# Patient Record
Sex: MALE | Race: WHITE | NOT HISPANIC OR LATINO | URBAN - METROPOLITAN AREA
[De-identification: names, ages, dates, MRNs, and addresses within clinical notes are randomized per-mention and may not be internally consistent; named-entity substitution may affect disease eponyms.]

---

## 2023-02-13 ENCOUNTER — INPATIENT (INPATIENT)
Facility: HOSPITAL | Age: 73
LOS: 2 days | Discharge: HOME | DRG: 433 | End: 2023-02-16
Attending: INTERNAL MEDICINE | Admitting: HOSPITALIST
Payer: MEDICARE

## 2023-02-13 VITALS
OXYGEN SATURATION: 97 % | RESPIRATION RATE: 18 BRPM | DIASTOLIC BLOOD PRESSURE: 85 MMHG | HEART RATE: 90 BPM | SYSTOLIC BLOOD PRESSURE: 178 MMHG

## 2023-02-13 DIAGNOSIS — R07.9 CHEST PAIN, UNSPECIFIED: ICD-10-CM

## 2023-02-13 LAB
ALBUMIN SERPL ELPH-MCNC: 2.5 G/DL — LOW (ref 3.3–5.2)
ALP SERPL-CCNC: 143 U/L — HIGH (ref 40–120)
ALT FLD-CCNC: 18 U/L — SIGNIFICANT CHANGE UP
ANION GAP SERPL CALC-SCNC: 7 MMOL/L — SIGNIFICANT CHANGE UP (ref 5–17)
APPEARANCE UR: CLEAR — SIGNIFICANT CHANGE UP
AST SERPL-CCNC: 44 U/L — HIGH
BACTERIA # UR AUTO: ABNORMAL
BASOPHILS # BLD AUTO: 0.04 K/UL — SIGNIFICANT CHANGE UP (ref 0–0.2)
BASOPHILS NFR BLD AUTO: 0.7 % — SIGNIFICANT CHANGE UP (ref 0–2)
BILIRUB SERPL-MCNC: 3.3 MG/DL — HIGH (ref 0.4–2)
BILIRUB UR-MCNC: NEGATIVE — SIGNIFICANT CHANGE UP
BUN SERPL-MCNC: 15.1 MG/DL — SIGNIFICANT CHANGE UP (ref 8–20)
CALCIUM SERPL-MCNC: 8.7 MG/DL — SIGNIFICANT CHANGE UP (ref 8.4–10.5)
CHLORIDE SERPL-SCNC: 107 MMOL/L — SIGNIFICANT CHANGE UP (ref 96–108)
CO2 SERPL-SCNC: 24 MMOL/L — SIGNIFICANT CHANGE UP (ref 22–29)
COLOR SPEC: YELLOW — SIGNIFICANT CHANGE UP
CREAT SERPL-MCNC: 1.27 MG/DL — SIGNIFICANT CHANGE UP (ref 0.5–1.3)
DIFF PNL FLD: ABNORMAL
EGFR: 60 ML/MIN/1.73M2 — SIGNIFICANT CHANGE UP
EOSINOPHIL # BLD AUTO: 0.39 K/UL — SIGNIFICANT CHANGE UP (ref 0–0.5)
EOSINOPHIL NFR BLD AUTO: 6.4 % — HIGH (ref 0–6)
EPI CELLS # UR: SIGNIFICANT CHANGE UP
FLUAV AG NPH QL: SIGNIFICANT CHANGE UP
FLUBV AG NPH QL: SIGNIFICANT CHANGE UP
GLUCOSE SERPL-MCNC: 106 MG/DL — HIGH (ref 70–99)
GLUCOSE UR QL: NEGATIVE MG/DL — SIGNIFICANT CHANGE UP
HCT VFR BLD CALC: 34.1 % — LOW (ref 39–50)
HGB BLD-MCNC: 11.5 G/DL — LOW (ref 13–17)
IMM GRANULOCYTES NFR BLD AUTO: 0.2 % — SIGNIFICANT CHANGE UP (ref 0–0.9)
KETONES UR-MCNC: NEGATIVE — SIGNIFICANT CHANGE UP
LEUKOCYTE ESTERASE UR-ACNC: NEGATIVE — SIGNIFICANT CHANGE UP
LYMPHOCYTES # BLD AUTO: 1.2 K/UL — SIGNIFICANT CHANGE UP (ref 1–3.3)
LYMPHOCYTES # BLD AUTO: 19.6 % — SIGNIFICANT CHANGE UP (ref 13–44)
MCHC RBC-ENTMCNC: 33.1 PG — SIGNIFICANT CHANGE UP (ref 27–34)
MCHC RBC-ENTMCNC: 33.7 GM/DL — SIGNIFICANT CHANGE UP (ref 32–36)
MCV RBC AUTO: 98.3 FL — SIGNIFICANT CHANGE UP (ref 80–100)
MONOCYTES # BLD AUTO: 0.73 K/UL — SIGNIFICANT CHANGE UP (ref 0–0.9)
MONOCYTES NFR BLD AUTO: 11.9 % — SIGNIFICANT CHANGE UP (ref 2–14)
NEUTROPHILS # BLD AUTO: 3.74 K/UL — SIGNIFICANT CHANGE UP (ref 1.8–7.4)
NEUTROPHILS NFR BLD AUTO: 61.2 % — SIGNIFICANT CHANGE UP (ref 43–77)
NITRITE UR-MCNC: NEGATIVE — SIGNIFICANT CHANGE UP
NT-PROBNP SERPL-SCNC: 221 PG/ML — SIGNIFICANT CHANGE UP (ref 0–300)
PH UR: 6 — SIGNIFICANT CHANGE UP (ref 5–8)
PLATELET # BLD AUTO: 86 K/UL — LOW (ref 150–400)
POTASSIUM SERPL-MCNC: 3.5 MMOL/L — SIGNIFICANT CHANGE UP (ref 3.5–5.3)
POTASSIUM SERPL-SCNC: 3.5 MMOL/L — SIGNIFICANT CHANGE UP (ref 3.5–5.3)
PROT SERPL-MCNC: 5.6 G/DL — LOW (ref 6.6–8.7)
PROT UR-MCNC: NEGATIVE — SIGNIFICANT CHANGE UP
RBC # BLD: 3.47 M/UL — LOW (ref 4.2–5.8)
RBC # FLD: 15.5 % — HIGH (ref 10.3–14.5)
RBC CASTS # UR COMP ASSIST: ABNORMAL /HPF (ref 0–4)
RSV RNA NPH QL NAA+NON-PROBE: SIGNIFICANT CHANGE UP
SARS-COV-2 RNA SPEC QL NAA+PROBE: SIGNIFICANT CHANGE UP
SODIUM SERPL-SCNC: 138 MMOL/L — SIGNIFICANT CHANGE UP (ref 135–145)
SP GR SPEC: 1.01 — SIGNIFICANT CHANGE UP (ref 1.01–1.02)
TROPONIN T SERPL-MCNC: <0.01 NG/ML — SIGNIFICANT CHANGE UP (ref 0–0.06)
TSH SERPL-MCNC: 2.22 UIU/ML — SIGNIFICANT CHANGE UP (ref 0.27–4.2)
UROBILINOGEN FLD QL: NEGATIVE MG/DL — SIGNIFICANT CHANGE UP
WBC # BLD: 6.11 K/UL — SIGNIFICANT CHANGE UP (ref 3.8–10.5)
WBC # FLD AUTO: 6.11 K/UL — SIGNIFICANT CHANGE UP (ref 3.8–10.5)
WBC UR QL: SIGNIFICANT CHANGE UP /HPF (ref 0–5)

## 2023-02-13 PROCEDURE — 71045 X-RAY EXAM CHEST 1 VIEW: CPT | Mod: 26

## 2023-02-13 PROCEDURE — 99223 1ST HOSP IP/OBS HIGH 75: CPT

## 2023-02-13 PROCEDURE — 74177 CT ABD & PELVIS W/CONTRAST: CPT | Mod: 26,MA

## 2023-02-13 RX ORDER — FUROSEMIDE 40 MG
40 TABLET ORAL
Refills: 0 | Status: DISCONTINUED | OUTPATIENT
Start: 2023-02-13 | End: 2023-02-14

## 2023-02-13 RX ORDER — FUROSEMIDE 40 MG
40 TABLET ORAL ONCE
Refills: 0 | Status: COMPLETED | OUTPATIENT
Start: 2023-02-13 | End: 2023-02-13

## 2023-02-13 RX ORDER — LISINOPRIL 2.5 MG/1
5 TABLET ORAL DAILY
Refills: 0 | Status: DISCONTINUED | OUTPATIENT
Start: 2023-02-13 | End: 2023-02-16

## 2023-02-13 RX ADMIN — Medication 40 MILLIGRAM(S): at 12:54

## 2023-02-13 NOTE — CONSULT NOTE ADULT - PROBLEM SELECTOR RECOMMENDATION 9
-One day of acute chest pain located at the right chest wall nipple site, described as squeezing / sharp, lasting one hour and is now almost completely resolved  -Trop neg x1, trend  -  -CXR xlear  -EKG non ischemic  -Obtain echo. Will determine ischemic eval once results  -Diurese, will order lasix IVP

## 2023-02-13 NOTE — ED ADULT NURSE REASSESSMENT NOTE - NS ED NURSE REASSESS COMMENT FT1
Assumed pt care at this time, A&Ox4, resp even/unlabored, ambulatory, slow steady gait noted as pt assisted to restroom. Pt denies pain/discomfort at this time. Pt noted to have BLLE edema, + BL pedal pulses, v/s obtain, BP elevated, MD made aware, no further interventions at this time. Pt admitted to obs, awaiting transfer to obs unit. No acute distress noted, all safety precautions in place, bed side rails up, bed locked in lowest position Assumed pt care at this time, A&Ox4, resp even/unlabored, ambulatory, slow steady gait noted as pt assisted to restroom. Pt denies pain/discomfort, SOB at this time. Pt noted to have BLLE edema, + BL pedal pulses, v/s obtain, BP elevated, MD made aware, no further interventions at this time, continuos cardiac monitoring and  in place, NSR on monitor, rate 68, SpO2 98% on room air. Pt admitted to obs, awaiting transfer to obs unit. No acute distress noted, all safety precautions in place, bed side rails up, bed locked in lowest position

## 2023-02-13 NOTE — ED PROVIDER NOTE - ATTENDING CONTRIBUTION TO CARE
PT with chest pain, sob and palpitations that started this morning.  PT states that it started approx. 2 h ago. [facility] Pt states that he is supposed to be on a water pill but doesn't know the last time he took it.  no n/v.. no fever/chills. no other complaints.    physical - rrr. ctab. abd - soft, nt. B/L 3+ LE edema.  no rash.    plan - labs and imaging reviewed.  Pt with significant LE edema. cards evaluated and recommended diuresis and tte. Pt with cirrhosis on CT with low albumin likely contributing to his edema.  case d/w dr. mcgee and rema boles.

## 2023-02-13 NOTE — CONSULT NOTE ADULT - SUBJECTIVE AND OBJECTIVE BOX
[facility]                                              CARDIOLOGY AT [address], [place] [address]                                             Telephone: [phone]. Fax:[phone]                                                       CARDIOLOGY CONSULTATION NOTE                                                                                             History obtained by: Patient and medical record  [facility]   obtained: Yes [  ] No [  x]  Reason for Consultation: CP / LE edema  Available out pt records reviewed: Yes [ x ] No [  ]    HPI:    Patient is a 72y old  Male PMH HTN, ?undiagnosed CHF but is on a water pill. Doesn't have an established cardiologist but [facility]. Presents with one day of acute chest pain located at the right chest wall nipple site, described as squeezing / sharp, lasting one hour and is now almost completely resolved. No prior episodes. Family hx of CHF. Endorsed 6 years ago he had a negative NST. Denies SOB, orthopnea, fever, weight gain        CARDIAC TESTING   ECHO:    STRESS:    CATH:     ELECTROPHYSIOLOGY:     PAST MEDICAL HISTORY  HTN (hypertension)        PAST SURGICAL HISTORY      SOCIAL HISTORY:  Denies smoking/alcohol/drugs  CIGARETTES:   former for 3 years   ALCOHOL:  DRUGS:    FAMILY HISTORY:    Family History of Cardiovascular Disease:  Yes [x  ] No [  ]  Coronary Artery Disease in first degree relative: Yes [  ] No [ x ]  Sudden Cardiac Death in First degree relative: Yes [  ] No [ x ]    HOME MEDICATIONS:      CURRENT CARDIAC MEDICATIONS:      CURRENT OTHER MEDICATIONS:      ALLERGIES:   No Known Allergies      REVIEW OF SYMPTOMS:   CONSTITUTIONAL: No fever, no chills, no weight loss, no weight gain, no fatigue   ENMT:  No vertigo; No sinus or throat pain  NECK: No pain or stiffness  CARDIOVASCULAR: +chest pain, no dyspnea, no syncope/presyncope, no palpitations, no dizziness, no Orthopnea, no Paroxsymal nocturnal dyspnea  RESPIRATORY: no Shortness of breath, no cough, no wheezing  : No dysuria, no hematuria   GI: No dark color stool, no nausea, no diarrhea, no constipation, no abdominal pain   NEURO: No headache, no slurred speech   MUSCULOSKELETAL: No joint pain or swelling; No muscle, back, or extremity pain. +LE edema  PSYCH: No agitation, no anxiety.    ALL OTHER REVIEW OF SYSTEMS ARE NEGATIVE.    VITAL SIGNS:  T(C): 37.2 (02-13-23 @ 12:57), Max: 37.2 (02-13-23 @ 12:57)  T(F): 98.9 (02-13-23 @ 12:57), Max: 98.9 (02-13-23 @ 12:57)  HR: 76 (02-13-23 @ 12:52) (76 - 90)  BP: 136/71 (02-13-23 @ 12:52) (136/71 - 178/85)  RR: 20 (02-13-23 @ 12:52) (18 - 20)  SpO2: 97% (02-13-23 @ 12:52) (97% - 97%)    INTAKE AND OUTPUT:       PHYSICAL EXAM:  Constitutional: Comfortable . No acute distress.   HEENT: Atraumatic and normocephalic , neck is supple . no JVD. No carotid bruit.  CNS: A&Ox3. No focal deficits.   Respiratory: CTAB, unlabored   Cardiovascular: RRR normal s1 s2. No murmur. No rubs or gallop.  Gastrointestinal: Soft, non-tender. +Bowel sounds.   Extremities: 2+ Peripheral Pulses, No clubbing, cyanosis, +edema  Psychiatric: Calm . no agitation.   Skin: Warm and dry, no ulcers on extremities     LABS:  ( 13 Feb 2023 12:24 )  Troponin T  <0.01,  CPK  X    , CKMB  X    ,                                 11.5   6.11  )-----------( 86       ( 13 Feb 2023 12:24 )             34.1     02-13    138  |  107  |  15.1  ----------------------------<  106<H>  3.5   |  24.0  |  1.27    Ca    8.7      13 Feb 2023 12:24    TPro  5.6<L>  /  Alb  2.5<L>  /  TBili  3.3<H>  /  DBili  x   /  AST  44<H>  /  ALT  18  /  AlkPhos  143<H>  02-13            Thyroid Stimulating Hormone, Serum: 2.22 uIU/mL (02-13-23 @ 12:24)      INTERPRETATION OF TELEMETRY: SR    ECG: SR no ischemia  Prior ECG: Yes [  ] No [x  ]      < from: Xray Chest 1 View- PORTABLE-Urgent (02.13.23 @ 14:02) >  IMPRESSION:  1. Clear lungs with no acute cardiopulmonary abnormalities.  2. A questionable thin sliver of free air beneath the right hemidiaphragm   cannot be excluded, in addition to noting colonic interposition. Consider   formal abdominal radiographs including an upright view if warranted   clinically.    < end of copied text >

## 2023-02-13 NOTE — ED ADULT NURSE NOTE - OBJECTIVE STATEMENT
A&Ox4, RR even and unlabored. skin is warm and dry. Swelling noted to bilateral lower extremities. PT states legs have been like this for awhile and are occasionally painful. PT coming to ED C/O left side chest pain that began this afternoon while at rest. Denies radiation. +dyspnea on exertion. Deneis cardaic hx. Placed on CM and labs obtained.

## 2023-02-13 NOTE — ED CDU PROVIDER INITIAL DAY NOTE - CLINICAL SUMMARY MEDICAL DECISION MAKING FREE TEXT BOX
PT with mild fluid overload        placed in obs for Therapeutic intensity. PT seen BY OBS PA found to be appropriate CDU PT care transferred to PA.

## 2023-02-13 NOTE — ED PROVIDER NOTE - CLINICAL SUMMARY MEDICAL DECISION MAKING FREE TEXT BOX
71yo M w/pmh HTN, CHF presents for chest pain, shortness of breath x2 hours, significant LE edema on exam. EKG shows NSR. Labs, CXR pending. Lasix 40mg IV given for suspected CHF exacerbation.

## 2023-02-13 NOTE — ED CDU PROVIDER INITIAL DAY NOTE - PHYSICAL EXAMINATION
General: well appearing, NAD  Head: NC/AT  Cardiac: RRR, no m/r/g, 3+ edema of b/l LEs  Respiratory: crackles at bilateral bases, equal chest wall expansions, no conversational dyspnea  Abdomen: soft, ND, NT  Neuro: AAOx3, coordinated movement of all 4 extremities  Psych: calm, cooperative, normal affect  Skin: warm and dry

## 2023-02-13 NOTE — ED PROVIDER NOTE - WR ORDER ID 1
HISTORY OF PRESENT ILLNESS  Patient presents with:  Weight Check: up 8 lbs    Iván Moore is a 35year old male here for follow up with medical weight loss program for the treatment of overweight, obesity, or morbid obesity.  Patient has gained-#8  lbs s N/V/D/C  MUSCULOSKELETAL: denies back pain, joint pains   NEURO: denies headaches or dizziness  PSYCH: denies change in behavior or mood, denies feeling sad or depressed    EXAM  /80   Pulse 90   Temp 98.6 °F (37 °C)   Resp 16   Ht 70\"   Wt (!) 303 tablet, Rfl: 1  Insulin Aspart Pen 100 UNIT/ML Subcutaneous Solution Pen-injector, Use up to 65 units daily, Disp: 60 mL, Rfl: 1  atorvastatin 20 MG Oral Tab, Take 1 tablet (20 mg total) by mouth nightly., Disp: 30 tablet, Rfl: 2  insulin glargine (BASAGLA 25mg bid off label for weight loss  · --advised of side effects and adverse effects of this medication  · Contradictions: none  · ekg completed in office today  · Nutrition: Low carb diet, recommended to eat breakfast daily/ regular protein intake  · [facility] [id number]

## 2023-02-13 NOTE — ED PROVIDER NOTE - PHYSICAL EXAMINATION
General: well appearing, NAD  Head: NC/AT  Cardiac: RRR, no m/r/g, 3+ edema of b/l LEs  Respiratory: crackles at bilateral bases, equal chest wall expansions, no conversational dyspnea  Abdomen: soft, ND, NT  Neuro: AAOx3, coordinated movement of all 4 extremities  Psych: calm, cooperative, normal affect  Skin: warm and dry RX PROGRESS NOTE: Warfarin Anticoagulation Monitoring Initiation Note    Warfarin prescribed for the indication of Deep vein thrombosis/Pulmonary embolism, CVA.    Target INR is 2.0 - 3.0.    Anticipated duration of therapy is Indefinite.    Patient continued on prior warfarin therapy.  Dose prior to pharmacist inpatient anticoagulation management service consultation was 7.5 mg on Friday and 5 mg the other six days of the week.    Most Recent Lab Values:  INR (no units)   Date/Time Value   03/10/2021 0506 1.1     HGB (g/dL)   Date/Time Value   03/10/2021 0506 11.3 (L)     HCT (%)   Date/Time Value   03/10/2021 0506 36.2     PLT (K/mcL)   Date/Time Value   03/10/2021 0506 170     GOT/AST (Units/L)   Date/Time Value   03/10/2021 0506 39 (H)     GPT/ALT (Units/L)   Date/Time Value   03/10/2021 0506 22     Serum creatinine: 1.28 mg/dL (H) 03/10/21 0506  Estimated creatinine clearance: 64.2 mL/min (A)  OB/Gyn status: Other      Medical History:  Weight: Weight: 107.3 kg (03/09/21 0700)  Age: 48 year old  Patient does not have factors that may warrant deviation from the standard protocol (protocol exception).     Other pertinent medical history includes: chronic CHF, HIV.    The patient's medication and allergy profile was reviewed for possible drug-allergy, drug-drug, and drug-herbal interactions.    Assessment & Plan:  For anticoagulation therapy, will continue current warfarin dose of 5 mg once.     Pharmacist inpatient anticoagulation management will review/monitor patient daily and order warfarin dose/regimen based on protocol.    Thank you,    Fabienne Palomino, PHARMD  3/10/2021 4:58 PM

## 2023-02-13 NOTE — ED CDU PROVIDER INITIAL DAY NOTE - NS ED ROS FT
Constitutional: no fever, no sweats, and no chills.  CV: +chest pain, +edema, +palpitations  Resp: no cough, +dyspnea  GI: no abdominal pain, no nausea and no vomiting.  MSK: no msk pain, no weakness  Skin: no lesions, and no rashes.  Neuro: no LOC, +headache, no dizziness  ROS otherwise negative except as noted in HPI.

## 2023-02-13 NOTE — ED ADULT NURSE NOTE - NSIMPLEMENTINTERV_GEN_ALL_ED
Implemented All Fall Risk Interventions:  [place] to call system. Call bell, personal items and telephone within reach. Instruct patient to call for assistance. Room bathroom lighting operational. Non-slip footwear when patient is off stretcher. Physically safe environment: no spills, clutter or unnecessary equipment. Stretcher in lowest position, wheels locked, appropriate side rails in place. Provide visual cue, wrist band, yellow gown, etc. Monitor gait and stability. Monitor for mental status changes and reorient to person, place, and time. Review medications for side effects contributing to fall risk. Reinforce activity limits and safety measures with patient and family.

## 2023-02-13 NOTE — ED ADULT NURSE NOTE - ED STAT RN HANDOFF DETAILS
Report given to BERNARD Green. Pt transported without incident, placed on continuos cardiac monitoring and , NSR on monitor, rate 68, SpO2 98% on room air

## 2023-02-13 NOTE — ED CDU PROVIDER INITIAL DAY NOTE - OBJECTIVE STATEMENT
71yo M w/pmh HTN, CHF presents for chest pain, shortness of breath x2 hours. Reports onset 9:30-10am, assoc/w HA, neck stiffness, palpitations, all symptoms resolved except residual improved HA and CP. Denies h/o similar symptoms. Also reports worsening b/l LE edema for weeks.  Denies f/ch, n/v, abdominal pain. Denies ischemic cardiac hx. Reports he takes a diuretic for b/l LE edema but stops whenever his symptoms improve, doesn't know when he last took it, says the pills got lost while moving residences. Denies sick contacts. Covid vaccinated, not flu vaccinated. Former smoker for 4 years, quit in 1970s. [facility]

## 2023-02-13 NOTE — ED ADULT TRIAGE NOTE - CHIEF COMPLAINT QUOTE
PT BIBA from home for chest pain that started this morning right sided radiating to neck.  Received 2 SL nitro and 324 ASA. No improvement of pain. EKG obtained

## 2023-02-13 NOTE — CONSULT NOTE ADULT - ASSESSMENT
72y old  Male PMH HTN, ?undiagnosed CHF but is on a water pill. Doesn't have an established cardiologist [facility]. Presents with one day of acute chest pain located at the right chest wall nipple site, described as squeezing / sharp, lasting one hour and is now almost completely resolved. No prior episodes. Family hx of CHF. Endorsed 6 years ago he had a negative NST.

## 2023-02-13 NOTE — ED CDU PROVIDER INITIAL DAY NOTE - ATTENDING APP SHARED VISIT CONTRIBUTION OF CARE
71yo M w/pmh HTN, CHF presents for chest pain, shortness of breath x2 hours. Reports onset 9:30-10am, assoc/w HA, neck stiffness, palpitations, all symptoms resolved except residual improved HA and CP. Denies h/o similar symptoms. Also reports worsening b/l LE edema for weeks.  Denies f/ch, n/v, abdominal pain. Denies ischemic cardiac hx. Reports he takes a diuretic for b/l LE edema but stops whenever his symptoms improve, doesn't know when he last took it, says the pills got lost while moving residences. Denies sick contacts.    seen by cardio who reccs diuresis, echo, and re-eval in AM

## 2023-02-13 NOTE — ED PROVIDER NOTE - OBJECTIVE STATEMENT
73yo M w/pmh HTN, CHF presents for chest pain, shortness of breath x2 hours. Reports onset 9:30-10am, assoc/w HA, neck stiffness, palpitations, all symptoms resolved except residual improved HA and CP. Denies h/o similar symptoms. Also reports worsening b/l LE edema for weeks.  Denies f/ch, n/v, abdominal pain. Denies ischemic cardiac hx. Reports he takes a diuretic for b/l LE edema but stops whenever his symptoms improve, doesn't know when he last took it, says the pills got lost while moving residences. Denies sick contacts. Covid vaccinated, not flu vaccinated. Former smoker for 4 years, quit in 1970s. [facility].

## 2023-02-13 NOTE — ED ADULT NURSE REASSESSMENT NOTE - NS ED NURSE REASSESS COMMENT FT1
PT returning back from CT scan now. reports relief of chest pain. Denies SOB while ambulating. Ambulates with assistance to restroom.

## 2023-02-14 DIAGNOSIS — I10 ESSENTIAL (PRIMARY) HYPERTENSION: ICD-10-CM

## 2023-02-14 DIAGNOSIS — R07.9 CHEST PAIN, UNSPECIFIED: ICD-10-CM

## 2023-02-14 DIAGNOSIS — R79.89 OTHER SPECIFIED ABNORMAL FINDINGS OF BLOOD CHEMISTRY: ICD-10-CM

## 2023-02-14 DIAGNOSIS — K86.9 DISEASE OF PANCREAS, UNSPECIFIED: ICD-10-CM

## 2023-02-14 DIAGNOSIS — K74.60 UNSPECIFIED CIRRHOSIS OF LIVER: ICD-10-CM

## 2023-02-14 DIAGNOSIS — R60.0 LOCALIZED EDEMA: ICD-10-CM

## 2023-02-14 DIAGNOSIS — Z90.49 ACQUIRED ABSENCE OF OTHER SPECIFIED PARTS OF DIGESTIVE TRACT: Chronic | ICD-10-CM

## 2023-02-14 LAB
ALBUMIN SERPL ELPH-MCNC: 2.9 G/DL — LOW (ref 3.3–5.2)
ALP SERPL-CCNC: 152 U/L — HIGH (ref 40–120)
ALT FLD-CCNC: 21 U/L — SIGNIFICANT CHANGE UP
ANION GAP SERPL CALC-SCNC: 10 MMOL/L — SIGNIFICANT CHANGE UP (ref 5–17)
APTT BLD: 40.5 SEC — HIGH (ref 27.5–35.5)
AST SERPL-CCNC: 54 U/L — HIGH
BILIRUB SERPL-MCNC: 4.1 MG/DL — HIGH (ref 0.4–2)
BUN SERPL-MCNC: 15.2 MG/DL — SIGNIFICANT CHANGE UP (ref 8–20)
CALCIUM SERPL-MCNC: 8.5 MG/DL — SIGNIFICANT CHANGE UP (ref 8.4–10.5)
CHLORIDE SERPL-SCNC: 106 MMOL/L — SIGNIFICANT CHANGE UP (ref 96–108)
CO2 SERPL-SCNC: 24 MMOL/L — SIGNIFICANT CHANGE UP (ref 22–29)
CREAT SERPL-MCNC: 1.15 MG/DL — SIGNIFICANT CHANGE UP (ref 0.5–1.3)
EGFR: 68 ML/MIN/1.73M2 — SIGNIFICANT CHANGE UP
GLUCOSE SERPL-MCNC: 68 MG/DL — LOW (ref 70–99)
INR BLD: 1.71 RATIO — HIGH (ref 0.88–1.16)
POTASSIUM SERPL-MCNC: 3.4 MMOL/L — LOW (ref 3.5–5.3)
POTASSIUM SERPL-SCNC: 3.4 MMOL/L — LOW (ref 3.5–5.3)
PROT SERPL-MCNC: 6.3 G/DL — LOW (ref 6.6–8.7)
PROTHROM AB SERPL-ACNC: 20 SEC — HIGH (ref 10.5–13.4)
SODIUM SERPL-SCNC: 140 MMOL/L — SIGNIFICANT CHANGE UP (ref 135–145)
TROPONIN T SERPL-MCNC: <0.01 NG/ML — SIGNIFICANT CHANGE UP (ref 0–0.06)

## 2023-02-14 PROCEDURE — 76705 ECHO EXAM OF ABDOMEN: CPT | Mod: 26

## 2023-02-14 PROCEDURE — 99285 EMERGENCY DEPT VISIT HI MDM: CPT

## 2023-02-14 PROCEDURE — 99233 SBSQ HOSP IP/OBS HIGH 50: CPT

## 2023-02-14 PROCEDURE — 99223 1ST HOSP IP/OBS HIGH 75: CPT

## 2023-02-14 RX ORDER — FUROSEMIDE 40 MG
40 TABLET ORAL DAILY
Refills: 0 | Status: DISCONTINUED | OUTPATIENT
Start: 2023-02-14 | End: 2023-02-16

## 2023-02-14 RX ORDER — SPIRONOLACTONE 25 MG/1
25 TABLET, FILM COATED ORAL DAILY
Refills: 0 | Status: DISCONTINUED | OUTPATIENT
Start: 2023-02-14 | End: 2023-02-16

## 2023-02-14 RX ADMIN — SPIRONOLACTONE 25 MILLIGRAM(S): 25 TABLET, FILM COATED ORAL at 19:09

## 2023-02-14 RX ADMIN — Medication 40 MILLIGRAM(S): at 05:23

## 2023-02-14 RX ADMIN — LISINOPRIL 5 MILLIGRAM(S): 2.5 TABLET ORAL at 05:23

## 2023-02-14 NOTE — ED ADULT NURSE REASSESSMENT NOTE - NS ED NURSE REASSESS COMMENT FT1
Assumed care of the patient @0730. Pt A&Ox4, VSS afebrile. Pt on cardiac monitor, normal sinus, denies chest pain at this time. Awaiting ECHo results and cardiac consult.  Patient in understanding of plan of care. Patient with no further questions for the RN. Resting in comfort. Call bell within reach and encouraged to use when assistance needed. Will continue to monitor.

## 2023-02-14 NOTE — CONSULT NOTE ADULT - PROBLEM SELECTOR RECOMMENDATION 2
CT A/P- mild intra/extrahepatic biliary dilation s/p cholecystectomy  US abd- CBD not visualized, mild prominence intrahepatic radicles as noted on recent CT    - Recommend MRCP w/wo contrast to further assess, may need ERCP   - Trend CBC, LFTs  - Monitor for fever  - Low fat diet

## 2023-02-14 NOTE — H&P ADULT - HISTORY OF PRESENT ILLNESS
72yr old male with PMH HTN and bilateral chronic pedal swelling presented to Er with Rt sided chest pain with shortness of breath. In ER, he was found to be HD stable, Labs show increased LFTs, T bili 3.3, Alk phos 143, AST 44 , ALT 18 gb 11.5, platelets 86, INR 1.71, albumin 2.9.  CT abd/pelvis shows end stage cirrhotic liver with portal hypertension and a spontaneous splenorenal shunt, cystic pancreatic body and tail lesions, no ascites, sigmoid diverticulosis, mild intra/extrahepatic biliary dilation s/p cholecystectomy. US abdomen shows cirrhotic liver, common bile duct not visualized, mild prominence of intrahepatic radicles as noted on recent CT, no ascites.  He was bev by cardio, however No concern for CHF, he symptoms likely 2/2 cirrhosis. GI recommends MRCP for further eval. He feels better from pain standpoint.

## 2023-02-14 NOTE — H&P ADULT - NSHPPHYSICALEXAM_GEN_ALL_CORE
PHYSICAL EXAM:    GENERAL: NAD, well-groomed, well-developed  HEAD:  Atraumatic, Normocephalic  EYES: EOMI, PERRLA, conjunctiva and sclera clear  ENMT: dry mucous membranes, edentulous, wears false teeth  NECK: Supple, No JVD  NERVOUS SYSTEM:  Alert & Oriented X3, Good concentration;   CHEST/LUNG: Clear to percussion bilaterally; No rales, rhonchi  HEART: Regular rate and rhythm; No murmurs  ABDOMEN: Soft, Nontender, Nondistended; Bowel sounds present  EXTREMITIES:  No clubbing, cyanosis, or edema  SKIN: maculopapular rash all over extensor surface

## 2023-02-14 NOTE — CONSULT NOTE ADULT - PROBLEM SELECTOR RECOMMENDATION 3
CT A/P- Multiple well-circumscribed cystic lesions within the pancreatic body/tail measuring up to 12x10 mm, favored to represent small pseudocysts or side branch IPMN.     - Recommend MRCP w/wo contrast to further assess as above   - May need eventual EUS   _________________________________________________________________  Assessment and recommendations are final when note is signed by the attending physician.

## 2023-02-14 NOTE — ED CDU PROVIDER DISPOSITION NOTE - CLINICAL COURSE
pt is a 71 y/o male with a pmhx of HTN, CHF that presented to the ed for chest pain, SOB was placed in observation for CHF exacerbation was seen by [place] cardiology had echo, cardiology stating most likely not from CHF, but due to new end stage cirrhosis GI consult obtained - will admit for further management of cirrhosis, elevated LFT's, MRCP

## 2023-02-14 NOTE — PROGRESS NOTE ADULT - NS ATTEND AMEND GEN_ALL_CORE FT
seen with above,    72M h/o obesity, former alcohol use quit >8yrs ago, HTN, [facility] was previously [facility] a [facility] presents with R-sided chest pain and LE edema  -pBNP 200s and CXR without pulmonary edema, TTE done with normal biventricular systolic function, no evidence of CHF  -etiology of LE suspect from lymphedema, recommend ACE bandage and outpatient vascular evaluation  -CT ab/pel with reports of end-stage cirrhosis with portal hypertension and splenorenal shunting- recommend GI/hepatology evaluation as patient is not aware of cirrhosis history, may benefit from Aldactone/Lasix combination; baseline PLT 86, T-bili 4.1 and INR 1.7 with MELD score of 19  -reconsult if new cardiac issue arise         Og Angelo DO, [facility]  Faculty Non-Invasive Cardiologist  [phone]

## 2023-02-14 NOTE — ED CDU PROVIDER DISPOSITION NOTE - ATTENDING CONTRIBUTION TO CARE
I agree with the PA's note and was available for any issues/concerns. I was directly involved in patient care. My brief overall assessment is as follows:    GI assessment noted; will admit

## 2023-02-14 NOTE — ED CDU PROVIDER SUBSEQUENT DAY NOTE - CLINICAL SUMMARY MEDICAL DECISION MAKING FREE TEXT BOX
PT with mild fluid overload  placed in obs for Therapeutic intensity.   Cardiology consulted  IV dirueses  Trend CR  CXR, negative, EKG non-ischemic  TTE Pending

## 2023-02-14 NOTE — PROGRESS NOTE ADULT - NS ATTEND OPT1A GEN_ALL_CORE
Progress Note    Pt was seen and examined. No wound issues overnight. Tolerating NPWT well  Denies f/c/n/v/d/cp/sob.        Scheduled Meds:   Current Facility-Administered Medications   Medication Dose Route Frequency Provider Last Rate Last Admin   â¢ gabapentin (NEURONTIN) capsule 300 mg  300 mg Oral TID Pradeep Schaefer MD   300 mg at 07/06/22 0902   â¢ metroNIDAZOLE (FLAGYL) tablet 500 mg  500 mg Oral TID Donato Dc PA-C   500 mg at 07/06/22 0902   â¢ losartan (COZAAR) tablet 100 mg  100 mg Oral Daily Emely Xie MD   100 mg at 07/06/22 0902   â¢ insulin lispro (ADMELOG,HumaLOG) - Correction Dose   Subcutaneous TID  Emely Xie MD   1 Units at 07/06/22 1154   â¢ sodium hypochlorite (DAKINS) 0.125 % (1/4 strength) irrigation solution   Topical 2 times per day Alejandra Cuellar MD   Given at 07/06/22 0910   â¢ furosemide (LASIX) tablet 40 mg  40 mg Oral Daily Emely Xie MD   40 mg at 07/06/22 0902   â¢ [Held by provider] insulin glargine (LANTUS) injection 9 Units  9 Units Subcutaneous Daily Emely Xie MD   9 Units at 07/01/22 0942   â¢ lidocaine (LIDOCARE) 4 % patch 1 patch  1 patch Transdermal Daily Emely Xie MD   1 patch at 07/06/22 0905   â¢ [Held by provider] insulin lispro (ADMELOG, HumaLOG) injection 7 Units  7 Units Subcutaneous TID  Marli Zhong MD   7 Units at 06/30/22 0828   â¢ piperacillin-tazobactam (ZOSYN) 3.375 g in sodium chloride 0.9 % 100 mL IVPB  3.375 g Intravenous 3 times per day Radha Santos MD 25 mL/hr at 07/06/22 0505 3.375 g at 07/06/22 0505   â¢ sodium chloride (PF) 0.9 % injection 10 mL  10 mL Injection 2 times per day Opal Goldberg DO   10 mL at 07/06/22 0910   â¢ potassium CHLORIDE (KLOR-CON M) vikash ER tablet 20 mEq  20 mEq Oral Daily with breakfast Dolorita H Onguru, CNP   20 mEq at 07/06/22 0901   â¢ insulin lispro (ADMELOG,HumaLOG) - Correction Dose   Subcutaneous Nightly Opal Goldberg DO   1 Units at 06/27/22 2137   â¢ enoxaparin (LOVENOX) injection 40 mg  40 mg Subcutaneous Daily Krishan Fontan, DO   40 mg at 07/06/22 0902   â¢ docusate sodium-sennosides (SENOKOT S) 50-8.6 MG 1 tablet  1 tablet Oral BID Krishan Fontan, DO   1 tablet at 07/06/22 0901   â¢ polyethylene glycol (MIRALAX) packet 17 g  17 g Oral Daily Krishan Fontan, DO   17 g at 07/03/22 0818   â¢ rosuvastatin (CRESTOR) tablet 20 mg  20 mg Oral Nightly Krishan Fontan, DO   20 mg at 07/05/22 2018   â¢ cholecalciferol (VITAMIN D) tablet 25 mcg  25 mcg Oral Daily Krishan Fontan, DO   25 mcg at 07/06/22 0902   â¢ mirtazapine (REMERON) tablet 15 mg  15 mg Oral Nightly Krishan Fontan, DO   15 mg at 07/05/22 2019   â¢ sodium chloride (PF) 0.9 % injection 2 mL  2 mL Intracatheter 2 times per day Lizzie Cornejo MD   2 mL at 07/06/22 0910   â¢ Phosphorus Standard Replacement Protocol   Does not apply See Vin Smallwood MD       â¢ Magnesium Standard Replacement Protocol   Does not apply See Vin Smallwood MD       â¢ clopidogrel (PLAVIX) tablet 75 mg  75 mg Oral Daily Lizzie Cornejo MD   75 mg at 07/06/22 0902   â¢ aspirin (ECOTRIN) enteric coated tablet 81 mg  81 mg Oral Daily Lizzie Cornejo MD   81 mg at 07/06/22 0901   â¢ finasteride (PROSCAR) tablet 5 mg  5 mg Oral Nightly Lizzie Cornejo MD   5 mg at 07/05/22 2018   â¢ tamsulosin (FLOMAX) capsule 0.4 mg  0.4 mg Oral QHS Lizzie Cornejo MD   0.4 mg at 07/05/22 2018   â¢ ferrous sulfate (65 mg Fe per 325 mg) tablet 325 mg  325 mg Oral Every Other Day Lizzie Cornejo MD   325 mg at 07/05/22 0831   â¢ acetaminophen (TYLENOL) tablet 650 mg  650 mg Oral Q6H Thaddeus Blas MD   650 mg at 07/06/22 0902   â¢ cyanocobalamin (Vitamin B-12) tablet 1,000 mcg  1,000 mcg Oral Daily Krishan Fontan, DO   1,000 mcg at 07/06/22 0902   â¢ metoPROLOL succinate (TOPROL-XL) ER tablet 25 mg  25 mg Oral Daily Krishan Bello DO   25 mg at 07/06/22 0902        PRN Meds:  Current Facility-Administered Medications   Medication Dose Route Frequency Provider Last Rate Last Admin   â¢ sodium chloride 0.9% infusion   Intravenous Continuous PRN Fransisco Rosenthal MD       â¢ cyclobenzaprine (FLEXERIL) tablet 5 mg  5 mg Oral TID PRN Fransisco Rosenthal MD       â¢ morphine injection 4 mg  4 mg Intravenous Q4H PRN Fransisco Rosenthal MD       â¢ HYDROcodone-acetaminophen [facility])  MG per tablet 1 tablet  1 tablet Oral Q6H PRN Fransisco Rosenthal MD   1 tablet at 07/05/22 1440   â¢ sodium chloride (PF) 0.9 % injection 10 mL  10 mL Injection PRN Jose Gill, DO       â¢ sodium chloride (PF) 0.9 % injection 20 mL  20 mL Injection PRN Jose Gill, DO       â¢ sodium chloride 0.9% infusion   Intravenous Continuous PRN Doloritatyana H Onguru, CNP 25 mL/hr at 06/29/22 0503 New Bag at 06/29/22 0503   â¢ polyethylene glycol (MIRALAX) packet 17 g  17 g Oral Daily PRN Jose Gill, DO       â¢ bisacodyl (DULCOLAX) suppository 10 mg  10 mg Rectal Daily PRN Jose Gill, DO       â¢ sodium chloride 0.9 % flush bag 25 mL  25 mL Intravenous PRN Christine Lentz MD       â¢ sodium chloride 0.9 % flush bag 25 mL  25 mL Intravenous PRN Christine Lentz MD       â¢ docusate sodium-sennosides (SENOKOT S) 50-8.6 MG 2 tablet  2 tablet Oral Daily PRN Christine Lentz MD       â¢ dextrose 50 % injection 25 g  25 g Intravenous PRN Christine Lentz MD       â¢ dextrose 50 % injection 12.5 g  12.5 g Intravenous PRN Christine Lentz MD       â¢ glucagon (GLUCAGEN) injection 1 mg  1 mg Intramuscular PRN Christine Lentz MD       â¢ dextrose (GLUTOSE) 40 % gel 15 g  15 g Oral PRN Christine Lentz MD       â¢ dextrose (GLUTOSE) 40 % gel 30 g  30 g Oral PRN Christine Lentz MD       â¢ lactulose (CHRONULAC) 10 GM/15ML solution 20 g  20 g Oral Daily PRN Christine Lentz MD       â¢ hydrALAZINE (APRESOLINE) tablet 25 mg  25 mg Oral Q8H PRN Jose Gill DO   25 mg at 07/03/22 0650        Physical Exam   Vitals:    07/06/22 0900   BP: (!) 167/71   Pulse: 100   Resp: 18   Temp: 99 Â°F (37.2 Â°C)         Gen: NAD  CV: rrr  Pulm: good resp effort  Skin: Sacral and left gluteal wounds are under negative pressure wound therapy-no air leaks or bleeding. There is approximately 300 cc of serosanguineous output in the canister    Wounds documentation below is from the nursing chart    Wound Leg Left Anterior Surgical Wound (Active)   Date First Assessed/Time First Assessed: 05/23/22 1417   Present on Hospital Admission: No  Location: Leg  Laterality: Left  Modifier: Anterior  Level of Skin Injury: Full Thickness  Primary Wound Type: Surgical Wound      Assessments 5/23/2022  3:40 PM 7/5/2022  4:00 AM   Dressing Assessment Clean;Dry; Intact Clean;Dry; Intact   Wound Dressing Gauze (multiple); Gauze roll-conforming (e.g. Terri); Elastic wrap bandage --   Wound Protection Other (comment) --       No Linked orders to display       Wound Buttock Left Surgical Wound (Active)   Date First Assessed/Time First Assessed: 05/25/22 1037   Location: Buttock  Laterality: Left  Primary Wound Type: (c) Surgical Wound      Assessments 5/25/2022 10:00 AM 7/5/2022  2:23 PM   Wound Care Team Consult Date -- 06/21/22   Dressing Assessment -- Intact;Drainage present   Dressing Activity Applied Changed   Dressing Changed On   05/25/22 07/05/22   Wound Exudate None Serosanguineous   Cleansing Agent Soap and water Normal saline   Wound Bed/Tissue Type Intact Pink;Yellow;Red   Periwound Condition -- Normal   Wound Edge Approximated Well defined   Wound Status -- Improving   Topical Agent Povidone iodine --   Wound Dressing -- Negative pressure device   Wound Last Measured -- 06/21/22       No Linked orders to display       Negative Pressure Device 06/21/22 (Active)   Placement Date: 06/21/22   Present at Time of Admission: No      Assessments 6/21/2022  3:42 PM 7/1/2022 11:41 AM   Setting (mmHg) 125;Continuous 125;Continuous   VAC Canister Changed On 06/22/22 --   Negative Pressure Instill Volume (mL) 24 mL 24 mL   Soak Time (minutes) 10 minutes 5 minutes   Therapy Time (hours) 2 hours 3 hours   Number of Foam Pieces Placed 6 7   Number of Foam Pieces Removed -- 7   Tubing Bridged To R lateral hip --   Tubing Y Connected? No No   Total Surface Area of Wound with VAC (cm2) -- 165 cm2       No Linked orders to display       Wound Sacrum (Active)   Date First Assessed: 06/21/22   Present on Hospital Admission: Yes  Location: Sacrum  Level of Skin Injury: Full Thickness      Assessments 6/21/2022  3:42 PM 7/6/2022  9:00 AM   Wound Care Team Consult Date 06/21/22 --   Dressing Assessment Intact;Drainage present Clean;Dry; Intact   Dressing Activity Changed --   Dressing Changed On   06/21/22 --   Wound Exudate Moderate;Serosanguineous --   Cleansing Agent Normal saline --   Wound Bed/Tissue Type Pink;Necrotic tissue, slough; Yellow;Black --   Periwound Condition Normal --   Wound Edge Well defined --   Wound Dressing Negative pressure device Negative pressure device   Wound Last Measured 06/21/22 --   Wound Length (cm) 12 cm --   Wound Width (cm) 13.2 cm --   Wound Depth (cm) 4.2 cm --   Wound Surface Area (cm^2) 158. 4 cm^2 --   Wound Volume (cm^3) 665.28 cm^3 --       No Linked orders to display       Lab Results   Component Value Date    WBC 13.6 (H) 07/06/2022    HGB 7.7 (L) 07/06/2022    HCT 24.2 (L) 07/06/2022    MCV 92.7 07/06/2022     07/06/2022        Lab Results   Component Value Date    CALCIUM 8.3 (L) 07/06/2022        Lab Results   Component Value Date    HGBA1C 6.2 (H) 06/20/2022        Lab Results   Component Value Date    ALBUMIN 1.5 (L) 07/06/2022        LAST SODIUM:  Sodium (mmol/L)   Date Value   07/06/2022 139       LAST POTASSIUM:  Potassium (mmol/L)   Date Value   07/06/2022 3.6       LAST CHLORIDE:  Chloride (mmol/L)   Date Value   07/06/2022 111 (H)       LAST GLUCOSE:  Glucose (mg/dL)   Date Value   07/06/2022 182 (H)       LAST CO2:  Carbon Dioxide (mmol/L)   Date Value   07/06/2022 21       LAST BUN:  BUN (mg/dL)   Date Value   07/06/2022 21 (H) LAST CREATININE:  Creatinine (mg/dL)   Date Value   07/06/2022 1.11       LAST MICRO:  No results found for: MICRO    No components found for: SEDRATE     Lab Results   Component Value Date    CRP 28.0 (H) 06/18/2022        Lab Results   Component Value Date    AST 17 07/06/2022          Assessment and Plan:    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 coli, E faecalis and strep anginosus -on Zosyn for 6 weeks per ID. Â EOT is 7/30/2022  - Continue negative pressure with instillation  -Next dressing change is planned for 7/08/2022  -Continue offloading, repositioning, and nutritionally supporting     Thank you for letting us participate in care of this pt    Will follow    LIZ Dumont, DO  [facility]  Answering service -- [phone] History/Exam/Medical decision making

## 2023-02-14 NOTE — ED CDU PROVIDER SUBSEQUENT DAY NOTE - ATTENDING APP SHARED VISIT CONTRIBUTION OF CARE
I agree with the PA's note and was available for any issues/concerns. I was directly involved in patient care. My brief overall assessment is as follows:    symptoms controlled; awaiting cardiology assessment

## 2023-02-14 NOTE — PHARMACOTHERAPY INTERVENTION NOTE - COMMENTS
Called [facility] to obtain medication list. Outpatient Medication Review updated.    HOME MEDICATIONS:  furosemide 20 mg oral tablet: 1 tab(s) orally once a day (14 Feb 2023 13:49)  lidocaine 4% patch: Apply topically to affected area once a day (14 Feb 2023 13:49)  losartan 50 mg oral tablet: 1 tab(s) orally 2 times a day (14 Feb 2023 13:49)  potassium chloride 10 mEq oral tablet, extended release: 2 tab(s) orally once a day (14 Feb 2023 13:49)   Called [facility] to obtain medication list. Last filled medications as 90 day supply in Oct 2022. Outpatient Medication Review updated.    HOME MEDICATIONS:  furosemide 20 mg oral tablet: 1 tab(s) orally once a day (14 Feb 2023 13:49)  lidocaine 4% patch: Apply topically to affected area once a day (14 Feb 2023 13:49)  losartan 50 mg oral tablet: 1 tab(s) orally 2 times a day (14 Feb 2023 13:49)  potassium chloride 10 mEq oral tablet, extended release: 2 tab(s) orally once a day (14 Feb 2023 13:49)

## 2023-02-14 NOTE — CONSULT NOTE ADULT - NS ATTEND AMEND GEN_ALL_CORE FT
On water pill. ? history of CHF. admitted with chest pain. ran out of his pills for 1 mth. moved recently.    CHF. diuresis.  Transthoracic echocardiogram . ischemic evaluation.
72 M h/o HTN, on lasix for ?reasons but didn't take for 1 mo due to misplacing pills while moving, here with LE edema. Incidentally noted to have cirrhosis, pt used to drink 8 y ago but stopped. No withdrawal, no blackouts in past. No ascites, encephalopathy or variceal bleeding ever.   s/p juan carlos, 20-25 y ago, no issues since with RUQ pain. TB, LAE rising, pt has no fever, no nausea.   Plan  would repeat LAE tomorrow  agree with PA suggestion for MRCP - alternatively, CT could comment on CBD dimension and +/- stones  will need outpatient hepatology f/u  hep serologies please  agree with diuretics - lasix:aldactone ratio should be 2:5

## 2023-02-14 NOTE — CONSULT NOTE ADULT - SUBJECTIVE AND OBJECTIVE BOX
Patient is a 72y old  Male who presents with a chief complaint of     HPI: 71 y/o M with PMHx HTN, ? CHF presents to ED for evaluation for right sided chest discomfort and shortness of breath for 2 hours yesterday morning. He reports associated headache and neck pain, states his symptoms have largely resolved with only a little bit of residual right sided chest discomfort. Currently he feels his LE edema has improved somewhat. He reports worsening bilateral lower extremity edema for several months. He is on Lasix (unknown dose) at home for his LE edema, he is unsure when he last took it. He follows with a cardiologist at the [facility].    He denies a known history of cirrhosis or liver disease. He reports a "normal" ultrasound of abdomen that was done several years ago. 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 No family history of liver disease or GI cancers. Denies fever/chills, nausea, vomiting, hematemesis, hematochezia, melena.     Labs show increasing LFTs, T bili 3.3 -> 4.1, Alk phos 143 -> 152, AST 44 -> 54, ALT 18 -> 21.  Hgb 11.5, platelets 86, INR 1.71, albumin 2.9.      CT abd/pelvis shows end stage cirrhotic liver with portal hypertension and a spontaneous splenorenal shunt, cystic pancreatic body and tail lesions, no ascites, sigmoid diverticulosis, mild intra/extrahepatic biliary dilation s/p cholecystectomy.  US abdomen shows cirrhotic liver, common bile duct not visualized, mild prominence of intrahepatic radicles as noted on recent CT, no ascites.      PAST MEDICAL & SURGICAL HISTORY:  HTN (hypertension)          Allergies    No Known Allergies    Intolerances        MEDICATIONS  (STANDING):  furosemide    Tablet 40 milliGRAM(s) Oral daily  lisinopril 5 milliGRAM(s) Oral daily  spironolactone 25 milliGRAM(s) Oral daily    MEDICATIONS  (PRN):      Social History:    Marital Status:  (   )    (   ) Single    (   )    (  )   Occupation:   Lives with: (  ) alone  (  ) children   (  ) spouse   (  ) parents  (  ) other    Substance Use (street drugs): (  ) never used  (  ) other:  Tobacco Usage:  (   ) never smoked   (   ) former smoker   (   ) current smoker  (     ) pack year  (        ) last cigarette date  Alcohol Usage:  Sexual History:     Family History   IBD (  ) Yes   (  ) No  GI Malignancy (  )  Yes    (  ) No    Health Management     Last Colonoscopy -      (     ) Advanced Directives: (     ) None    (      ) DNR    (     ) DNI    (     ) Health Care Proxy:       REVIEW OF SYSTEMS:   General: Negative  HEENT: Negative  CV: Negative  Respiratory: Negative  GI: See HPI  : Negative  MSK: Negative  Hematologic: Negative  Skin: Negative      Vital Signs Last 24 Hrs  T(C): 36.6 (2023 11:46), Max: 37.2 (2023 12:57)  T(F): 97.9 (2023 11:46), Max: 98.9 (2023 12:57)  HR: 75 (2023 11:46) (65 - 87)  BP: 158/83 (2023 11:46) (136/71 - 188/79)  BP(mean): --  RR: 19 (2023 11:46) (17 - 20)  SpO2: 98% (2023 11:46) (95% - 99%)    Parameters below as of 2023 11:46  Patient On (Oxygen Delivery Method): room air        PHYSICAL EXAM:   GENERAL:  No acute distress  HEENT:  NC/AT, conjunctiva clear, sclera anicteric  CHEST:  No increased effort, breath sounds clear  HEART:  Regular rate   ABDOMEN:  Soft, non-tender, non-distended, normoactive bowel sounds, no rebound or guarding  EXTREMITIES: 3+ edema b/l   SKIN:  Warm, dry  NEURO:  Calm, cooperative        LABS:                        11.5   6.11  )-----------( 86       ( 2023 12:24 )             34.1     02-14    140  |  106  |  15.2  ----------------------------<  68<L>  3.4<L>   |  24.0  |  1.15    Ca    8.5      2023 05:45    TPro  6.3<L>  /  Alb  2.9<L>  /  TBili  4.1<H>  /  DBili  x   /  AST  54<H>  /  ALT  21  /  AlkPhos  152<H>        Comprehensive Metabolic Panel (23 @ 12:24)    Sodium, Serum: 138 mmol/L    Potassium, Serum: 3.5 mmol/L    Chloride, Serum: 107: Chloride reference range changed from ..10/26/2022  . mmol/L    Carbon Dioxide, Serum: 24.0 mmol/L    Anion Gap, Serum: 7 mmol/L    Blood Urea Nitrogen, Serum: 15.1 mg/dL    Creatinine, Serum: 1.27 mg/dL    Glucose, Serum: 106 mg/dL    Calcium, Total Serum: 8.7: Prior Reference Range of 8.6 – 10.2 was amended as of 2022 to 8.4 –  10.5. mg/dL    Protein Total, Serum: 5.6 g/dL    Albumin, Serum: 2.5 g/dL    Bilirubin Total, Serum: 3.3 mg/dL    Alkaline Phosphatase, Serum: 143 U/L    Aspartate Aminotransferase (AST/SGOT): 44 U/L    Alanine Aminotransferase (ALT/SGPT): 18 U/L        PT/INR - ( 2023 10:57 )   PT: 20.0 sec;   INR: 1.71 ratio         PTT - ( 2023 10:57 )  PTT:40.5 sec  Urinalysis Basic - ( 2023 17:57 )    Color: Yellow / Appearance: Clear / S.010 / pH: x  Gluc: x / Ketone: Negative  / Bili: Negative / Urobili: Negative mg/dL   Blood: x / Protein: Negative / Nitrite: Negative   Leuk Esterase: Negative / RBC: 6-10 /HPF / WBC 0-2 /HPF   Sq Epi: x / Non Sq Epi: Occasional / Bacteria: Occasional      LIVER FUNCTIONS - ( 2023 05:45 )  Alb: 2.9 g/dL / Pro: 6.3 g/dL / ALK PHOS: 152 U/L / ALT: 21 U/L / AST: 54 U/L / GGT: x             RADIOLOGY & ADDITIONAL TESTS:    < from: CT Abdomen and Pelvis w/ IV Cont (23 @ 16:49) >    ACC: [id number] EXAM:  CT ABDOMEN AND PELVIS IC   ORDERED BY: RICK RAYA     PROCEDURE DATE:  2023          INTERPRETATION:  CLINICAL INFORMATION: Chest pain, shortness of breath.   Question free air in the peritoneal cavity on chest radiograph. Normal   white count.    COMPARISON: Frontal chest radiograph performed the same day.    CONTRAST/COMPLICATIONS:  IV Contrast: Omnipaque 350  96 cc administered   4 cc discarded  Oral Contrast: NONE  Complications: None reported at time of study completion    PROCEDURE:  CT of the Abdomen and Pelvis was performed.  Sagittal and coronal reformats were performed.    FINDINGS:  LOWER CHEST: Mild elevation of the left hemidiaphragm. The lung bases are   clear.    LIVER: The liver is markedly shrunken, mildly heterogeneous in   enhancement, and nodular in contour, with mild prominence of the left   lobe laterally, compatible with end-stage cirrhosis. The hepatic veins   are patent. There is trace perihepatic fluid. The patent main portal vein   and left and right portal veins are diminutive. There is a large   spontaneous splenorenal shunt.  BILE DUCTS: Mild intra and extrahepatic biliary dilatation.  GALLBLADDER: Removed.  SPLEEN: Within normal limits.  PANCREAS: There are multiple well-circumscribed cystic lesions within the   pancreatic body and tail measuring up to 12 x 10 mm, favored to represent   small pseudocysts or side branch IPMN. Otherwise, the pancreas is   unremarkable in appearance and enhancement. There is no peripancreatic   stranding or fluid collection.  ADRENALS: Within normal limits.  KIDNEYS/URETERS: Mild bilateral renal cortical irregularity and thinning,   compatible with scarring. Tiny bilateral renal cysts. Otherwise, within   normal limits.    BLADDER: Mildconcentric wall thickening.  REPRODUCTIVE ORGANS: The prostate is enlarged and invaginates the bladder   base.    BOWEL: There is extension of small bowel loops under the right   hemidiaphragm. Sigmoid diverticulosis. No bowel obstruction. Appendix is   not definitively identified. There is no evidence of inflammatory process   adjacent to the cecal base.  PERITONEUM: No ascites. No free air in the peritoneal cavity.  VESSELS: Within normal limits.  RETROPERITONEUM/LYMPH NODES: Shotty para-aorticand aortocaval lymph   nodes. There is no pelvic, iliac chain, obturator, or inguinal adenopathy.  ABDOMINAL WALL: Status post ventral pelvic wall hernia repair with mesh.   There is a lenticular 6.0 x 1.3 x 5.6 cm chronic appearing seroma in the   subcutaneous tissues overlying the mesh. There are moderate bilateral   fat-containing direct inguinal hernias.  BONES: Chronic mild anterior wedging deformity of L5. Spondylosis of the   thoracic and lumbar spine.    IMPRESSION:  1. End-stage cirrhotic liver with portal hypertension and a spontaneous   splenorenal shunt.  2. Cystic pancreatic body and tail lesions, which may represent small   pseudocysts or side branch IPMN.  3. A follow-up nonemergent pre and postcontrast MRI/MRCP can be performed   for further evaluation of the liver and pancreas.  4. No evidence of free air in the peritoneal cavity.  5. Mild bladder wall thickening. Enlarged prostate. Correlate clinically   for bladder outlet obstruction and/or cystitis.    --- End of Report ---            OLMAN VOSS MD; Attending Radiologist  This document has been electronically signed. 2023  4:59PM    < end of copied text >    < from: US Abdomen Upper Quadrant Right (23 @ 11:35) >    ACC: [id number] EXAM:  US ABDOMEN RT UPR QUADRANT   ORDERED BY: JOSE JANE     PROCEDURE DATE:  2023          INTERPRETATION:  CLINICAL INFORMATION: History of liver cirrhosis    COMPARISON: None available.    TECHNIQUE: Sonography of the right upper quadrant.    FINDINGS:  Limited due to shadowing from bowel gas  Liver: Cirrhotic  Bile ducts: Common duct not visualized. Mild prominence of intrahepatic   radicles as noted on recent CT, may reflect postcholecystectomy state  Gallbladder: Cholecystectomy.  Pancreas: Not adequately visualized.  Right kidney: 9.0 cm. No hydronephrosis.  Ascites: None.  IVC: Not visualized.    IMPRESSION:  No acute findings  Cirrhosis  Limitations as discussed..        --- End of Report ---            KAYLAN ESCAMILLA MD; Attending Radiologist  This document has been electronically signed. 2023 12:15PM    < end of copied text >       Patient is a 72y old  Male who presents with a chief complaint of LE edema, RUQ discomfort.     HPI: 71 y/o M with PMHx HTN, ? CHF presents to ED for evaluation for right sided chest discomfort and shortness of breath for 2 hours yesterday morning. He reports associated headache and neck pain, states his symptoms have largely resolved with only a little bit of residual right sided chest discomfort. Currently he feels his LE edema has improved somewhat. He reports worsening bilateral lower extremity edema for several months. He is on Lasix (unknown dose) at home for his LE edema, he is unsure when he last took it. He follows with a cardiologist at the [facility].    He denies a known history of cirrhosis or liver disease. He reports a "normal" ultrasound of abdomen that was done several years ago. 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 No family history of liver disease or GI cancers. Denies fever/chills, nausea, vomiting, hematemesis, hematochezia, melena.     Labs show increasing LFTs, T bili 3.3 -> 4.1, Alk phos 143 -> 152, AST 44 -> 54, ALT 18 -> 21.  Hgb 11.5, platelets 86, INR 1.71, albumin 2.9.      CT abd/pelvis shows end stage cirrhotic liver with portal hypertension and a spontaneous splenorenal shunt, cystic pancreatic body and tail lesions, no ascites, sigmoid diverticulosis, mild intra/extrahepatic biliary dilation s/p cholecystectomy.  US abdomen shows cirrhotic liver, common bile duct not visualized, mild prominence of intrahepatic radicles as noted on recent CT, no ascites.      PAST MEDICAL & SURGICAL HISTORY:  HTN (hypertension)          Allergies    No Known Allergies    Intolerances        MEDICATIONS  (STANDING):  furosemide    Tablet 40 milliGRAM(s) Oral daily  lisinopril 5 milliGRAM(s) Oral daily  spironolactone 25 milliGRAM(s) Oral daily    MEDICATIONS  (PRN):      Social History:    Marital Status:  (   )    (   ) Single    (   )    (  )   Occupation:   Lives with: (  ) alone  (  ) children   (  ) spouse   (  ) parents  (  ) other    Substance Use (street drugs): (  ) never used  (  ) other:  Tobacco Usage:  (   ) never smoked   (   ) former smoker   (   ) current smoker  (     ) pack year  (        ) last cigarette date  Alcohol Usage:  Sexual History:     Family History   IBD (  ) Yes   (  ) No  GI Malignancy (  )  Yes    (  ) No    Health Management     Last Colonoscopy -      (     ) Advanced Directives: (     ) None    (      ) DNR    (     ) DNI    (     ) Health Care Proxy:       REVIEW OF SYSTEMS:   General: Negative  HEENT: Negative  CV: Negative  Respiratory: Negative  GI: See HPI  : Negative  MSK: Negative  Hematologic: Negative  Skin: Negative      Vital Signs Last 24 Hrs  T(C): 36.6 (2023 11:46), Max: 37.2 (2023 12:57)  T(F): 97.9 (2023 11:46), Max: 98.9 (2023 12:57)  HR: 75 (2023 11:46) (65 - 87)  BP: 158/83 (2023 11:46) (136/71 - 188/79)  BP(mean): --  RR: 19 (2023 11:46) (17 - 20)  SpO2: 98% (2023 11:46) (95% - 99%)    Parameters below as of 2023 11:46  Patient On (Oxygen Delivery Method): room air        PHYSICAL EXAM:   GENERAL:  No acute distress  HEENT:  NC/AT, conjunctiva clear, sclera anicteric  CHEST:  No increased effort, breath sounds clear  HEART:  Regular rate   ABDOMEN:  Soft, non-tender, non-distended, normoactive bowel sounds, no rebound or guarding  EXTREMITIES: 3+ edema b/l   SKIN:  Warm, dry  NEURO:  Calm, cooperative        LABS:                        11.5   6.11  )-----------( 86       ( 2023 12:24 )             34.1     02-14    140  |  106  |  15.2  ----------------------------<  68<L>  3.4<L>   |  24.0  |  1.15    Ca    8.5      2023 05:45    TPro  6.3<L>  /  Alb  2.9<L>  /  TBili  4.1<H>  /  DBili  x   /  AST  54<H>  /  ALT  21  /  AlkPhos  152<H>  02-      Comprehensive Metabolic Panel (23 @ 12:24)    Sodium, Serum: 138 mmol/L    Potassium, Serum: 3.5 mmol/L    Chloride, Serum: 107: Chloride reference range changed from ..10/26/2022  . mmol/L    Carbon Dioxide, Serum: 24.0 mmol/L    Anion Gap, Serum: 7 mmol/L    Blood Urea Nitrogen, Serum: 15.1 mg/dL    Creatinine, Serum: 1.27 mg/dL    Glucose, Serum: 106 mg/dL    Calcium, Total Serum: 8.7: Prior Reference Range of 8.6 – 10.2 was amended as of 2022 to 8.4 –  10.5. mg/dL    Protein Total, Serum: 5.6 g/dL    Albumin, Serum: 2.5 g/dL    Bilirubin Total, Serum: 3.3 mg/dL    Alkaline Phosphatase, Serum: 143 U/L    Aspartate Aminotransferase (AST/SGOT): 44 U/L    Alanine Aminotransferase (ALT/SGPT): 18 U/L        PT/INR - ( 2023 10:57 )   PT: 20.0 sec;   INR: 1.71 ratio         PTT - ( 2023 10:57 )  PTT:40.5 sec  Urinalysis Basic - ( 2023 17:57 )    Color: Yellow / Appearance: Clear / S.010 / pH: x  Gluc: x / Ketone: Negative  / Bili: Negative / Urobili: Negative mg/dL   Blood: x / Protein: Negative / Nitrite: Negative   Leuk Esterase: Negative / RBC: 6-10 /HPF / WBC 0-2 /HPF   Sq Epi: x / Non Sq Epi: Occasional / Bacteria: Occasional      LIVER FUNCTIONS - ( 2023 05:45 )  Alb: 2.9 g/dL / Pro: 6.3 g/dL / ALK PHOS: 152 U/L / ALT: 21 U/L / AST: 54 U/L / GGT: x             RADIOLOGY & ADDITIONAL TESTS:    < from: CT Abdomen and Pelvis w/ IV Cont (23 @ 16:49) >    ACC: [id number] EXAM:  CT ABDOMEN AND PELVIS IC   ORDERED BY: RICK RAYA     PROCEDURE DATE:  2023          INTERPRETATION:  CLINICAL INFORMATION: Chest pain, shortness of breath.   Question free air in the peritoneal cavity on chest radiograph. Normal   white count.    COMPARISON: Frontal chest radiograph performed the same day.    CONTRAST/COMPLICATIONS:  IV Contrast: Omnipaque 350  96 cc administered   4 cc discarded  Oral Contrast: NONE  Complications: None reported at time of study completion    PROCEDURE:  CT of the Abdomen and Pelvis was performed.  Sagittal and coronal reformats were performed.    FINDINGS:  LOWER CHEST: Mild elevation of the left hemidiaphragm. The lung bases are   clear.    LIVER: The liver is markedly shrunken, mildly heterogeneous in   enhancement, and nodular in contour, with mild prominence of the left   lobe laterally, compatible with end-stage cirrhosis. The hepatic veins   are patent. There is trace perihepatic fluid. The patent main portal vein   and left and right portal veins are diminutive. There is a large   spontaneous splenorenal shunt.  BILE DUCTS: Mild intra and extrahepatic biliary dilatation.  GALLBLADDER: Removed.  SPLEEN: Within normal limits.  PANCREAS: There are multiple well-circumscribed cystic lesions within the   pancreatic body and tail measuring up to 12 x 10 mm, favored to represent   small pseudocysts or side branch IPMN. Otherwise, the pancreas is   unremarkable in appearance and enhancement. There is no peripancreatic   stranding or fluid collection.  ADRENALS: Within normal limits.  KIDNEYS/URETERS: Mild bilateral renal cortical irregularity and thinning,   compatible with scarring. Tiny bilateral renal cysts. Otherwise, within   normal limits.    BLADDER: Mildconcentric wall thickening.  REPRODUCTIVE ORGANS: The prostate is enlarged and invaginates the bladder   base.    BOWEL: There is extension of small bowel loops under the right   hemidiaphragm. Sigmoid diverticulosis. No bowel obstruction. Appendix is   not definitively identified. There is no evidence of inflammatory process   adjacent to the cecal base.  PERITONEUM: No ascites. No free air in the peritoneal cavity.  VESSELS: Within normal limits.  RETROPERITONEUM/LYMPH NODES: Shotty para-aorticand aortocaval lymph   nodes. There is no pelvic, iliac chain, obturator, or inguinal adenopathy.  ABDOMINAL WALL: Status post ventral pelvic wall hernia repair with mesh.   There is a lenticular 6.0 x 1.3 x 5.6 cm chronic appearing seroma in the   subcutaneous tissues overlying the mesh. There are moderate bilateral   fat-containing direct inguinal hernias.  BONES: Chronic mild anterior wedging deformity of L5. Spondylosis of the   thoracic and lumbar spine.    IMPRESSION:  1. End-stage cirrhotic liver with portal hypertension and a spontaneous   splenorenal shunt.  2. Cystic pancreatic body and tail lesions, which may represent small   pseudocysts or side branch IPMN.  3. A follow-up nonemergent pre and postcontrast MRI/MRCP can be performed   for further evaluation of the liver and pancreas.  4. No evidence of free air in the peritoneal cavity.  5. Mild bladder wall thickening. Enlarged prostate. Correlate clinically   for bladder outlet obstruction and/or cystitis.    --- End of Report ---            OLMAN VOSS MD; Attending Radiologist  This document has been electronically signed. 2023  4:59PM    < end of copied text >    < from: US Abdomen Upper Quadrant Right (23 @ 11:35) >    ACC: [id number] EXAM:  US ABDOMEN RT UPR QUADRANT   ORDERED BY: JOSE JANE     PROCEDURE DATE:  2023          INTERPRETATION:  CLINICAL INFORMATION: History of liver cirrhosis    COMPARISON: None available.    TECHNIQUE: Sonography of the right upper quadrant.    FINDINGS:  Limited due to shadowing from bowel gas  Liver: Cirrhotic  Bile ducts: Common duct not visualized. Mild prominence of intrahepatic   radicles as noted on recent CT, may reflect postcholecystectomy state  Gallbladder: Cholecystectomy.  Pancreas: Not adequately visualized.  Right kidney: 9.0 cm. No hydronephrosis.  Ascites: None.  IVC: Not visualized.    IMPRESSION:  No acute findings  Cirrhosis  Limitations as discussed..        --- End of Report ---            KAYLAN ESCAMILLA MD; Attending Radiologist  This document has been electronically signed. 2023 12:15PM    < end of copied text >

## 2023-02-14 NOTE — CONSULT NOTE ADULT - PROBLEM SELECTOR RECOMMENDATION 9
CT A/P- end stage cirrhotic liver with portal hypertension and a spontaneous splenorenal shunt, cystic pancreatic body and tail lesions, no ascites, sigmoid diverticulosis, mild intra/extrahepatic biliary dilation s/p cholecystectomy.  US abd- cirrhotic liver, CBD not visualized, mild prominence intrahepatic radicles as noted on recent CT, no ascites.    MELD-NA 19 today    - Trend renal function, LFTs, electrolytes, coags daily  - Agree with diuretics (Lasix 40mg QD, Aldactone 25mg QD), Monitor renal function and electrolytes, monitor BP  - Avoid NSAIDs, hepatotoxic drugs  - AFP pending at this time  - MVI, thiamine and folic acid. Optimize nutrition, ensure adequate protein intake, low sodium.  - EGD to assess for varices, can be done as an outpatient. If ultimately needs inpatient ERCP, we can assess at that time   - Will need follow up with Hepatologist, either at [facility] or with our hepatologist Dr. Dumont CT A/P- end stage cirrhotic liver with portal hypertension and a spontaneous splenorenal shunt, cystic pancreatic body and tail lesions, no ascites, sigmoid diverticulosis, mild intra/extrahepatic biliary dilation s/p cholecystectomy.  US abd- cirrhotic liver, CBD not visualized, mild prominence intrahepatic radicles as noted on recent CT, no ascites.    MELD-NA 19 today    - Trend renal function, LFTs, electrolytes, coags daily  - Agree with diuretics (Lasix 40mg QD, Aldactone 100 mg QD), Monitor renal function and electrolytes, monitor BP  - Avoid NSAIDs, hepatotoxic drugs  - AFP pending at this time  - MVI, thiamine and folic acid. Optimize nutrition, ensure adequate protein intake, low sodium.  - EGD to assess for varices, can be done as an outpatient. If ultimately needs inpatient ERCP, we can assess at that time   - Will need follow up with Hepatologist, either at [facility] or with our hepatologist Dr. Dumont CT A/P- end stage cirrhotic liver with portal hypertension and a spontaneous splenorenal shunt, cystic pancreatic body and tail lesions, no ascites, sigmoid diverticulosis, mild intra/extrahepatic biliary dilation s/p cholecystectomy.  US abd- cirrhotic liver, CBD not visualized, mild prominence intrahepatic radicles as noted on recent CT, no ascites.    MELD-NA 19 today    - Trend renal function, LFTs, electrolytes, coags daily  - Agree with diuretics (Lasix 40mg QD, Aldactone 100 mg QD), Monitor renal function and electrolytes, monitor BP  - Avoid NSAIDs, hepatotoxic drugs  - AFP pending at this time, check hepatitis panel   - MVI, thiamine and folic acid. Optimize nutrition, ensure adequate protein intake, low sodium.  - EGD to assess for varices, can be done as an outpatient. If ultimately needs inpatient ERCP, we can assess at that time   - Will need follow up with Hepatologist, either at [facility] or with our hepatologist Dr. Dumont

## 2023-02-14 NOTE — H&P ADULT - ASSESSMENT
72yr old male with PMH HTN and bilateral chronic pedal swelling presented to Er with Rt sided chest pain with shortness of breath. In ER, he was found to be HD stable, Labs show increased LFTs, T bili 3.3, Alk phos 143, AST 44 , ALT 18 gb 11.5, platelets 86, INR 1.71, albumin 2.9.  CT abd/pelvis shows end stage cirrhotic liver with portal hypertension and a spontaneous splenorenal shunt, cystic pancreatic body and tail lesions, no ascites, sigmoid diverticulosis, mild intra/extrahepatic biliary dilation s/p cholecystectomy. US abdomen shows cirrhotic liver, common bile duct not visualized, mild prominence of intrahepatic radicles as noted on recent CT, no ascites.  ECHO - normal EF. He was bev by cardio, however No concern for CHF, he symptoms likely 2/2 cirrhosis. GI recommends MRCP for further eval. He feels better from pain standpoint.     # RUQ pain, elevated LFTs  MRCP per GI recs   currently he is asymptomatic   ct regular diet     # Cirrhosis of liver and abnormal Ct abd   Coagulopathy   will need outpt follow up - [facility] Dumont   EGD as outpt per GI   on lasix , add spironolactone     # HTN - on losartan    # VTE p x- IPCs for now 2/2 elevated INR

## 2023-02-15 LAB
ALBUMIN SERPL ELPH-MCNC: 2.8 G/DL — LOW (ref 3.3–5.2)
ALP SERPL-CCNC: 141 U/L — HIGH (ref 40–120)
ALT FLD-CCNC: 20 U/L — SIGNIFICANT CHANGE UP
ANION GAP SERPL CALC-SCNC: 8 MMOL/L — SIGNIFICANT CHANGE UP (ref 5–17)
AST SERPL-CCNC: 51 U/L — HIGH
BASOPHILS # BLD AUTO: 0.04 K/UL — SIGNIFICANT CHANGE UP (ref 0–0.2)
BASOPHILS NFR BLD AUTO: 0.6 % — SIGNIFICANT CHANGE UP (ref 0–2)
BILIRUB SERPL-MCNC: 4.5 MG/DL — HIGH (ref 0.4–2)
BUN SERPL-MCNC: 16.6 MG/DL — SIGNIFICANT CHANGE UP (ref 8–20)
CALCIUM SERPL-MCNC: 8.7 MG/DL — SIGNIFICANT CHANGE UP (ref 8.4–10.5)
CHLORIDE SERPL-SCNC: 104 MMOL/L — SIGNIFICANT CHANGE UP (ref 96–108)
CO2 SERPL-SCNC: 28 MMOL/L — SIGNIFICANT CHANGE UP (ref 22–29)
CREAT SERPL-MCNC: 1.33 MG/DL — HIGH (ref 0.5–1.3)
EGFR: 57 ML/MIN/1.73M2 — LOW
EOSINOPHIL # BLD AUTO: 0.39 K/UL — SIGNIFICANT CHANGE UP (ref 0–0.5)
EOSINOPHIL NFR BLD AUTO: 6 % — SIGNIFICANT CHANGE UP (ref 0–6)
GLUCOSE SERPL-MCNC: 77 MG/DL — SIGNIFICANT CHANGE UP (ref 70–99)
HCT VFR BLD CALC: 38.6 % — LOW (ref 39–50)
HCV AB S/CO SERPL IA: 0.15 S/CO — SIGNIFICANT CHANGE UP (ref 0–0.99)
HCV AB SERPL-IMP: SIGNIFICANT CHANGE UP
HGB BLD-MCNC: 12.8 G/DL — LOW (ref 13–17)
IMM GRANULOCYTES NFR BLD AUTO: 0.3 % — SIGNIFICANT CHANGE UP (ref 0–0.9)
INR BLD: 1.67 RATIO — HIGH (ref 0.88–1.16)
LYMPHOCYTES # BLD AUTO: 1.12 K/UL — SIGNIFICANT CHANGE UP (ref 1–3.3)
LYMPHOCYTES # BLD AUTO: 17.3 % — SIGNIFICANT CHANGE UP (ref 13–44)
MCHC RBC-ENTMCNC: 32.9 PG — SIGNIFICANT CHANGE UP (ref 27–34)
MCHC RBC-ENTMCNC: 33.2 GM/DL — SIGNIFICANT CHANGE UP (ref 32–36)
MCV RBC AUTO: 99.2 FL — SIGNIFICANT CHANGE UP (ref 80–100)
MONOCYTES # BLD AUTO: 0.8 K/UL — SIGNIFICANT CHANGE UP (ref 0–0.9)
MONOCYTES NFR BLD AUTO: 12.4 % — SIGNIFICANT CHANGE UP (ref 2–14)
NEUTROPHILS # BLD AUTO: 4.09 K/UL — SIGNIFICANT CHANGE UP (ref 1.8–7.4)
NEUTROPHILS NFR BLD AUTO: 63.4 % — SIGNIFICANT CHANGE UP (ref 43–77)
PLATELET # BLD AUTO: 86 K/UL — LOW (ref 150–400)
POTASSIUM SERPL-MCNC: 3.4 MMOL/L — LOW (ref 3.5–5.3)
POTASSIUM SERPL-SCNC: 3.4 MMOL/L — LOW (ref 3.5–5.3)
PROT SERPL-MCNC: 6 G/DL — LOW (ref 6.6–8.7)
PROTHROM AB SERPL-ACNC: 19.5 SEC — HIGH (ref 10.5–13.4)
RBC # BLD: 3.89 M/UL — LOW (ref 4.2–5.8)
RBC # FLD: 15.4 % — HIGH (ref 10.3–14.5)
SODIUM SERPL-SCNC: 140 MMOL/L — SIGNIFICANT CHANGE UP (ref 135–145)
WBC # BLD: 6.46 K/UL — SIGNIFICANT CHANGE UP (ref 3.8–10.5)
WBC # FLD AUTO: 6.46 K/UL — SIGNIFICANT CHANGE UP (ref 3.8–10.5)

## 2023-02-15 PROCEDURE — 74181 MRI ABDOMEN W/O CONTRAST: CPT | Mod: 26

## 2023-02-15 PROCEDURE — 99233 SBSQ HOSP IP/OBS HIGH 50: CPT

## 2023-02-15 RX ORDER — POTASSIUM CHLORIDE 20 MEQ
20 PACKET (EA) ORAL ONCE
Refills: 0 | Status: COMPLETED | OUTPATIENT
Start: 2023-02-15 | End: 2023-02-15

## 2023-02-15 RX ADMIN — SPIRONOLACTONE 25 MILLIGRAM(S): 25 TABLET, FILM COATED ORAL at 05:22

## 2023-02-15 RX ADMIN — Medication 40 MILLIGRAM(S): at 05:22

## 2023-02-15 RX ADMIN — LISINOPRIL 5 MILLIGRAM(S): 2.5 TABLET ORAL at 05:22

## 2023-02-15 RX ADMIN — Medication 1 MILLIGRAM(S): at 02:24

## 2023-02-15 RX ADMIN — Medication 20 MILLIEQUIVALENT(S): at 19:02

## 2023-02-15 NOTE — PROGRESS NOTE ADULT - PROBLEM SELECTOR PLAN 1
-One day of acute chest pain located at the right chest wall nipple site, described as squeezing / sharp, lasting one hour and is now almost completely resolved. Atypical in nature  -Trop neg   -  -CXR clear  -EKG non ischemic  -No I+Os documented  -CT with cirrhosis, GI consult    -ECHO preserved EF, DDI  -Changed lasix to PO and added aldactone both for cirrhosis
CT A/P- end stage cirrhotic liver with portal hypertension and a spontaneous splenorenal shunt, cystic pancreatic body and tail lesions, no ascites, sigmoid diverticulosis, mild intra/extrahepatic biliary dilation s/p cholecystectomy.  US abd- cirrhotic liver, CBD not visualized, mild prominence intrahepatic radicles as noted on recent CT, no ascites.    MRCP- cirrhotic liver, splenic varices   MELD-NA 21 today    - Trend renal function, LFTs, electrolytes, coags daily  - Agree with diuretics (Lasix 40mg QD, Aldactone 100 mg QD), Monitor renal function and electrolytes, monitor BP  - Avoid NSAIDs, hepatotoxic drugs  - AFP pending at this time, check hepatitis panel   - MVI, thiamine and folic acid. Optimize nutrition, ensure adequate protein intake, low sodium.  - EGD to assess for varices, can be done as an outpatient. If ultimately needs inpatient ERCP, we can assess at that time   - Will need follow up with Hepatologist, either at [facility] or with our hepatologist Dr. Dumont

## 2023-02-15 NOTE — PROGRESS NOTE ADULT - PROBLEM SELECTOR PLAN 2
-C/w lisinopril, uptitrate accordingly
CT A/P- mild intra/extrahepatic biliary dilation s/p cholecystectomy  US abd- CBD not visualized, mild prominence intrahepatic radicles as noted on recent CT  MRCP- CBD 1.1cm, no cholelithiasis or obstructing mass    - No indication for ERCP   - Trend CBC, LFTs  - Monitor for fever  - Low fat diet

## 2023-02-15 NOTE — PROGRESS NOTE ADULT - SUBJECTIVE AND OBJECTIVE BOX
Chief Complaint:  Patient is a 72y old  Male who presents with a chief complaint of RUQ pain (2023 15:00)      HPI/ 24 hr events: Patient seen and examined at bedside. He is unhappy that he cannot have a hamburger for dinner, states they gave him too many vegetables. He thinks leg swelling has improved, he denies further abdominal pain or chest pain. Denies nausea, vomiting, diarrhea, abdominal pain, hematemesis, hematochezia, melena. Vitals are stable, no leukocytosis, Hgb 12.8, T bili 4.5, Alk phos 141, AST 51, INR 1.67 today. 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 MELD-Na 21 today.       REVIEW OF SYSTEMS:   General: Negative  HEENT: Negative  CV: Negative  Respiratory: Negative  GI: See HPI  : Negative  MSK: Negative  Hematologic: Negative  Skin: Negative    MEDICATIONS:   MEDICATIONS  (STANDING):  furosemide    Tablet 40 milliGRAM(s) Oral daily  lisinopril 5 milliGRAM(s) Oral daily  spironolactone 25 milliGRAM(s) Oral daily    MEDICATIONS  (PRN):      ALLERGIES:   Allergies    No Known Allergies    Intolerances        VITAL SIGNS:   Vital Signs Last 24 Hrs  T(C): 36.7 (15 Feb 2023 07:40), Max: 36.7 (15 Feb 2023 04:16)  T(F): 98.1 (15 Feb 2023 07:40), Max: 98.1 (15 Feb 2023 04:16)  HR: 75 (15 Feb 2023 07:40) (73 - 93)  BP: 171/91 (15 Feb 2023 07:40) (147/73 - 171/91)  BP(mean): --  RR: 18 (15 Feb 2023 07:40) (18 - 19)  SpO2: 98% (15 Feb 2023 07:40) (96% - 100%)    Parameters below as of 15 Feb 2023 07:40  Patient On (Oxygen Delivery Method): room air      I&O's Summary    2023 07:01  -  15 Feb 2023 07:00  --------------------------------------------------------  IN: 0 mL / OUT: 650 mL / NET: -650 mL        PHYSICAL EXAM:   GENERAL:  No acute distress  HEENT:  NC/AT, conjunctiva clear, sclera anicteric  CHEST:  No increased effort, breath sounds clear  HEART:  Regular rate   ABDOMEN:  Soft, non-tender, non-distended, normoactive bowel sounds, no rebound or guarding  EXTREMITIES: B/l edema  SKIN:  Warm, dry  NEURO:  Calm, cooperative    LABS:                        12.8   6.46  )-----------( 86       ( 15 Feb 2023 06:37 )             38.6     02-15    140  |  104  |  16.6  ----------------------------<  77  3.4<L>   |  28.0  |  1.33<H>    Ca    8.7      15 Feb 2023 06:37    TPro  6.0<L>  /  Alb  2.8<L>  /  TBili  4.5<H>  /  DBili  x   /  AST  51<H>  /  ALT  20  /  AlkPhos  141<H>  02-15    LIVER FUNCTIONS - ( 15 Feb 2023 06:37 )  Alb: 2.8 g/dL / Pro: 6.0 g/dL / ALK PHOS: 141 U/L / ALT: 20 U/L / AST: 51 U/L / GGT: x           PT/INR - ( 15 Feb 2023 06:37 )   PT: 19.5 sec;   INR: 1.67 ratio         PTT - ( 2023 10:57 )  PTT:40.5 sec  Urinalysis Basic - ( 2023 17:57 )    Color: Yellow / Appearance: Clear / S.010 / pH: x  Gluc: x / Ketone: Negative  / Bili: Negative / Urobili: Negative mg/dL   Blood: x / Protein: Negative / Nitrite: Negative   Leuk Esterase: Negative / RBC: 6-10 /HPF / WBC 0-2 /HPF   Sq Epi: x / Non Sq Epi: Occasional / Bacteria: Occasional                                      RADIOLOGY & ADDITIONAL STUDIES:      ACC: [id number] EXAM:  MR MRCP   ORDERED BY: JOSEELLY JANE     PROCEDURE DATE:  02/15/2023          INTERPRETATION:  CLINICAL INFORMATION: Right upper quadrant pain.   Elevated LFTs and cirrhosis.    COMPARISON: No prior MRIs available for comparison. Correlation is made   to CT scan of 2023 sonogram of 2023..    CONTRAST/COMPLICATIONS:  IV Contrast: NONE  Oral Contrast: NONE  Complications: None reported at time of study completion    PROCEDURE:  MRI of the abdomen was performed.  MRCP was performed.    FINDINGS: Suboptimal evaluation due to motion artifact throughout the   exam.    LIVER: Shrunken and cirrhotic morphology liver. Evaluation for focal   masses is limited without contrast material..  BILE DUCTS: Common bile duct is dilated measuring 1.1 cm. This tapers   smoothly to the level of the ampulla. There is no: No cholelithiasis   demonstrated. There is no obstructing mass.  GALLBLADDER: Cholecystectomy.  SPLEEN: Within normal limits.  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 The largest lesion measures up to   1.4 cm. Pancreatic duct is within normal limits. Additional punctate   cystic lesions of the pancreas are also suggested..  ADRENALS: Within normal limits.  KIDNEYS/URETERS: Right-sided subcentimeter renal cysts are appreciated.   There is a 3 mm T2 hypointense and T1 hyperintense cortical lesion of the   right interpolar kidney, likely tiny hemorrhagic cyst. There is no   urinary tract obstruction.    VISUALIZED PORTIONS:  BOWEL: Within normal limits.  PELVIS: Diffusely thick-walled and trabeculated urinary bladder. Prostate   gland is enlarged.  PERITONEUM: No ascites.  VESSELS: Extensive splenic varices are appreciated..  RETROPERITONEUM/LYMPH NODES: No lymphadenopathy.  ABDOMINAL WALL: Soft tissue edema.  BONES: Curvature and degenerative changes of the spine appreciated..    IMPRESSION:  Suboptimal evaluation due to extensive motion artifact.    Cirrhotic liver. Please note evaluation for focal masses is limited in   the absence of IV contrast. There are associated splenic varices.    Dilated common bile duct, possibly related to cholecystectomy status.   There is no choledocholithiasis or obstructing mass.    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    Thick-walled urinary bladder likely related sequelae of chronic outlet   obstruction from prostate enlargement. Cystitis will appears similar   correlation with urinalysis is advised.    Generalized soft tissue edema.    Additional findings as above.            --- End of Report ---            KENDALL ALEXIS MD; Attending Radiologist  This document has been electronically signed. Feb 15 2023  4:18AM  02-15-23 @ 03:38    < from: CT Abdomen and Pelvis w/ IV Cont (23 @ 16:49) >    ACC: [id number] EXAM:  CT ABDOMEN AND PELVIS IC   ORDERED BY: RICK RAYA     PROCEDURE DATE:  2023          INTERPRETATION:  CLINICAL INFORMATION: Chest pain, shortness of breath.   Question free air in the peritoneal cavity on chest radiograph. Normal   white count.    COMPARISON: Frontal chest radiograph performed the same day.    CONTRAST/COMPLICATIONS:  IV Contrast: Omnipaque 350  96 cc administered   4 cc discarded  Oral Contrast: NONE  Complications: None reported at time of study completion    PROCEDURE:  CT of the Abdomen and Pelvis was performed.  Sagittal and coronal reformats were performed.    FINDINGS:  LOWER CHEST: Mild elevation of the left hemidiaphragm. The lung bases are   clear.    LIVER: The liver is markedly shrunken, mildly heterogeneous in   enhancement, and nodular in contour, with mild prominence of the left   lobe laterally, compatible with end-stage cirrhosis. The hepatic veins   are patent. There is trace perihepatic fluid. The patent main portal vein   and left and right portal veins are diminutive. There is a large   spontaneous splenorenal shunt.  BILE DUCTS: Mild intra and extrahepatic biliary dilatation.  GALLBLADDER: Removed.  SPLEEN: Within normal limits.  PANCREAS: There are multiple well-circumscribed cystic lesions within the   pancreatic body and tail measuring up to 12 x 10 mm, favored to represent   small pseudocysts or side branch IPMN. Otherwise, the pancreas is   unremarkable in appearance and enhancement. There is no peripancreatic   stranding or fluid collection.  ADRENALS: Within normal limits.  KIDNEYS/URETERS: Mild bilateral renal cortical irregularity and thinning,   compatible with scarring. Tiny bilateral renal cysts. Otherwise, within   normal limits.    BLADDER: Mildconcentric wall thickening.  REPRODUCTIVE ORGANS: The prostate is enlarged and invaginates the bladder   base.    BOWEL: There is extension of small bowel loops under the right   hemidiaphragm. Sigmoid diverticulosis. No bowel obstruction. Appendix is   not definitively identified. There is no evidence of inflammatory process   adjacent to the cecal base.  PERITONEUM: No ascites. No free air in the peritoneal cavity.  VESSELS: Within normal limits.  RETROPERITONEUM/LYMPH NODES: Shotty para-aorticand aortocaval lymph   nodes. There is no pelvic, iliac chain, obturator, or inguinal adenopathy.  ABDOMINAL WALL: Status post ventral pelvic wall hernia repair with mesh.   There is a lenticular 6.0 x 1.3 x 5.6 cm chronic appearing seroma in the   subcutaneous tissues overlying the mesh. There are moderate bilateral   fat-containing direct inguinal hernias.  BONES: Chronic mild anterior wedging deformity of L5. Spondylosis of the   thoracic and lumbar spine.    IMPRESSION:  1. End-stage cirrhotic liver with portal hypertension and a spontaneous   splenorenal shunt.  2. Cystic pancreatic body and tail lesions, which may represent small   pseudocysts or side branch IPMN.  3. A follow-up nonemergent pre and postcontrast MRI/MRCP can be performed   for further evaluation of the liver and pancreas.  4. No evidence of free air in the peritoneal cavity.  5. Mild bladder wall thickening. Enlarged prostate. Correlate clinically   for bladder outlet obstruction and/or cystitis.    --- End of Report ---            OLMAN VOSS MD; Attending Radiologist  This document has been electronically signed. 2023  4:59PM    < end of copied text >    < from: US Abdomen Upper Quadrant Right (23 @ 11:35) >    ACC: [id number] EXAM:  US ABDOMEN RT UPR QUADRANT   ORDERED BY: JOSE JANE     PROCEDURE DATE:  2023          INTERPRETATION:  CLINICAL INFORMATION: History of liver cirrhosis    COMPARISON: None available.    TECHNIQUE: Sonography of the right upper quadrant.    FINDINGS:  Limited due to shadowing from bowel gas  Liver: Cirrhotic  Bile ducts: Common duct not visualized. Mild prominence of intrahepatic   radicles as noted on recent CT, may reflect postcholecystectomy state  Gallbladder: Cholecystectomy.  Pancreas: Not adequately visualized.  Right kidney: 9.0 cm. No hydronephrosis.  Ascites: None.  IVC: Not visualized.    IMPRESSION:  No acute findings  Cirrhosis  Limitations as discussed..        --- End of Report ---            KAYLAN ESCAMILLA MD; Attending Radiologist  This document has been electronically signed. 2023 12:15PM    < end of copied text >      
Patient is a 72y old  Male who presents with a chief complaint of RUQ pain (15 Feb 2023 09:49)    Patient seen and examined at bedside.     ALLERGIES:  No Known Allergies    MEDICATIONS  (STANDING):  furosemide    Tablet 40 milliGRAM(s) Oral daily  lisinopril 5 milliGRAM(s) Oral daily  potassium chloride    Tablet ER 20 milliEquivalent(s) Oral once  spironolactone 25 milliGRAM(s) Oral daily    MEDICATIONS  (PRN):    Vital Signs Last 24 Hrs  T(F): 98.5 (15 Feb 2023 11:10), Max: 98.5 (15 Feb 2023 11:10)  HR: 77 (15 Feb 2023 11:10) (73 - 93)  BP: 155/88 (15 Feb 2023 11:10) (147/77 - 171/91)  RR: 20 (15 Feb 2023 11:10) (18 - 20)  SpO2: 98% (15 Feb 2023 11:10) (96% - 100%)  I&O's Summary    2023 07:01  -  15 Feb 2023 07:00  --------------------------------------------------------  IN: 0 mL / OUT: 650 mL / NET: -650 mL      PHYSICAL EXAM:  General: NAD, Alert  ENT: MMM, no thrush  Neck: Supple, No JVD  Lungs: Clear to auscultation bilaterally, good air entry, non-labored breathing  Cardio: +s1/s2  Abdomen: Soft, Nontender, Nondistended; Bowel sounds present  Extremities: No calf tenderness     LABS:                        12.8   6.46  )-----------( 86       ( 15 Feb 2023 06:37 )             38.6     02-15    140  |  104  |  16.6  ----------------------------<  77  3.4   |  28.0  |  1.33    Ca    8.7      15 Feb 2023 06:37    TPro  6.0  /  Alb  2.8  /  TBili  4.5  /  DBili  x   /  AST  51  /  ALT  20  /  AlkPhos  141  02-15    PT/INR - ( 15 Feb 2023 06:37 )   PT: 19.5 sec;   INR: 1.67 ratio    PTT - ( 2023 10:57 )  PTT:40.5 sec    TSH 2.22   TSH with FT4 reflex --  Total T3 --    Urinalysis Basic - ( 2023 17:57 )  Color: Yellow / Appearance: Clear / S.010 / pH: x  Gluc: x / Ketone: Negative  / Bili: Negative / Urobili: Negative mg/dL   Blood: x / Protein: Negative / Nitrite: Negative   Leuk Esterase: Negative / RBC: 6-10 /HPF / WBC 0-2 /HPF   Sq Epi: x / Non Sq Epi: Occasional / Bacteria: Occasional    RADIOLOGY & ADDITIONAL TESTS:  - no new tests    Care Discussed with Consultants/Other Providers:   Gastroenterology
                                                         [facility]                                                         CARDIOLOGY AT [facility]                                                                  [address] [facility], [place]-[id number] [place] [address]                                                         Telephone: [phone]. Fax:[phone]                                                                             PROGRESS NOTE    Reason for follow up: CP / LE edema  Update: LE edema with mild improvement      Review of symptoms:   Cardiac:  No chest pain. No dyspnea. No palpitations.  Respiratory: no cough. No dyspnea  Gastrointestinal: No diarrhea. No abdominal pain. No bleeding.   Neuro: No focal neuro complaints.    Vitals:  T(C): 36.6 (02-14-23 @ 07:36), Max: 37.2 (02-13-23 @ 12:57)  HR: 87 (02-14-23 @ 07:36) (65 - 90)  BP: 160/81 (02-14-23 @ 07:36) (136/71 - 188/79)  RR: 17 (02-14-23 @ 07:36) (17 - 20)  SpO2: 96% (02-14-23 @ 07:36) (95% - 99%)  Wt(kg): --  I&O's Summary    Weight (kg): 95.254 (02-13 @ 12:57)    PHYSICAL EXAM:  Appearance: Comfortable. No acute distress  HEENT:  Atraumatic. Normocephalic.  Normal oral mucosa  Neurologic: A & O x 3, no gross focal deficits.  Cardiovascular: RRR S1 S2, No murmur, no rubs/gallops. No JVD  Respiratory: Lungs clear to auscultation, unlabored   Gastrointestinal:  Soft, Non-tender, + BS  Lower Extremities: 2+ Peripheral Pulses, No clubbing, cyanosis, +edema  Psychiatry: Patient is calm. No agitation.   Skin: warm and dry.    CURRENT CARDIAC MEDICATIONS:  furosemide    Tablet 40 milliGRAM(s) Oral daily  lisinopril 5 milliGRAM(s) Oral daily  spironolactone 25 milliGRAM(s) Oral daily      CURRENT OTHER MEDICATIONS:      LABS:	 	  ( 14 Feb 2023 05:45 )  Troponin T  <0.01,  CPK  X    , CKMB  X    , BNP X        , ( 13 Feb 2023 12:24 )  Troponin T  <0.01,  CPK  X    , CKMB  X    ,                                 11.5   6.11  )-----------( 86       ( 13 Feb 2023 12:24 )             34.1     02-14    140  |  106  |  15.2  ----------------------------<  68<L>  3.4<L>   |  24.0  |  1.15    Ca    8.5      14 Feb 2023 05:45    TPro  6.3<L>  /  Alb  2.9<L>  /  TBili  4.1<H>  /  DBili  x   /  AST  54<H>  /  ALT  21  /  AlkPhos  152<H>  02-14      Lipid Profile:   HgA1c:   TSH: Thyroid Stimulating Hormone, Serum: 2.22 uIU/mL      TELEMETRY: SR      DIAGNOSTIC TESTING:  [ ] Echocardiogram: IN PROGRESS

## 2023-02-15 NOTE — PROGRESS NOTE ADULT - PROBLEM SELECTOR PROBLEM 3
Procedure:  Circumcision  Date: 2023  Procedure Start Time: 1836  Procedure End Time: 1853  Time out:  Correct patient, procedure, and site verified.  Premedicated: 24% sucrose po  Consent: obtained    Patient positioned supine.  Site prepped with chlorhexadine and allowed to dry per hospital policy.  Dorsal penile nerve block completed with 0.8 mL of  1% lidocaine and subcutaneous ring block with an additional 0.4 ml 1% lidocaine.  Penile foreskin removed using Gomco 1.3. Patient tolerated procedure well. Hemostasis achieved.     Estimated blood loss: <1mL  Complications: none    Nely Hamm MD    
Edema leg
Pancreatic lesion

## 2023-02-15 NOTE — PROGRESS NOTE ADULT - NS ATTEND AMEND GEN_ALL_CORE FT
Patient seen and examined at bedside. Here with peripheral edema and cirrhosis. He does not have ascites. He has chronic liver failure which may explain his elevated t bili as MRCP negative for biliary obstruction. He needs close hepatology follow up and transplant evaluation. Please arrange follow up with Dr. Dumont. Continue diuretics and low sodium diet. Monitor renal function as it is slightly elevated today.

## 2023-02-15 NOTE — PROGRESS NOTE ADULT - PROBLEM SELECTOR PLAN 3
CT A/P- Multiple well-circumscribed cystic lesions within the pancreatic body/tail measuring up to 12x10 mm, favored to represent small pseudocysts or side branch IPMN.   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     - Will need outpatient follow up for surveillance   - May need eventual EUS   _________________________________________________________________  Assessment and recommendations are final when note is signed by the attending physician. CT A/P- Multiple well-circumscribed cystic lesions within the pancreatic body/tail measuring up to 12x10 mm, favored to represent small pseudocysts or side branch IPMN.   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     - Will need outpatient follow up for surveillance

## 2023-02-15 NOTE — PROGRESS NOTE ADULT - ASSESSMENT
Patient is a 72y old  Male PMH HTN, ?undiagnosed CHF but is on a water pill. Doesn't have an established cardiologist [facility]. Presents with one day of acute chest pain located at the right chest wall nipple site, described as squeezing / sharp, lasting one hour and is now almost completely resolved. No prior episodes. Family hx of CHF. Endorsed 6 years ago he had a negative NST.
72yr old male with PMH HTN and bilateral chronic pedal swelling     #Cirrhosis of liver  - probable end-stage   - gi consult appreciated  - will need eventual hepatology follow up[facility] Dumont  - w/ elevated bilirubin- monitor if continuing to go up may need ercp  - lasix, spironolactone   - w/ coagulopathy (elevated inr)  - check afp meld  - w/ pancreatic cyst -> check ca 19-9    #HTN- Essential   - monitor blood pressure   - losartan    #hypokalemia  - repleted     #DVT Prophylaxis  - ayana    spoke to patient brother [phone] hospital course reviewed. states his daughter is RN and has questions as well gave her call and reviewed imaging labs and consultant reccs  [phone] Layne- would like to be call prior to discharge

## 2023-02-16 ENCOUNTER — TRANSCRIPTION ENCOUNTER (OUTPATIENT)
Age: 73
End: 2023-02-16

## 2023-02-16 VITALS
DIASTOLIC BLOOD PRESSURE: 78 MMHG | TEMPERATURE: 98 F | SYSTOLIC BLOOD PRESSURE: 142 MMHG | OXYGEN SATURATION: 97 % | HEART RATE: 78 BPM | RESPIRATION RATE: 19 BRPM

## 2023-02-16 LAB
ALPHA-1-FETOPROTEIN-L3: SIGNIFICANT CHANGE UP % (ref 0–9.9)
ALPHA-1-FETOPROTEIN: 3.3 NG/ML — SIGNIFICANT CHANGE UP (ref 0–8.4)
APPEARANCE UR: CLEAR — SIGNIFICANT CHANGE UP
BILIRUB DIRECT SERPL-MCNC: 0.7 MG/DL — HIGH (ref 0–0.3)
BILIRUB INDIRECT FLD-MCNC: 2.5 MG/DL — HIGH (ref 0.2–1)
BILIRUB SERPL-MCNC: 3.2 MG/DL — HIGH (ref 0.4–2)
BILIRUB SERPL-MCNC: 3.2 MG/DL — HIGH (ref 0.4–2)
BILIRUB UR-MCNC: NEGATIVE — SIGNIFICANT CHANGE UP
CANCER AG19-9 SERPL-ACNC: 104 U/ML — HIGH
COLOR SPEC: YELLOW — SIGNIFICANT CHANGE UP
CREAT SERPL-MCNC: 1.29 MG/DL — SIGNIFICANT CHANGE UP (ref 0.5–1.3)
DIFF PNL FLD: NEGATIVE — SIGNIFICANT CHANGE UP
EGFR: 59 ML/MIN/1.73M2 — LOW
GLUCOSE UR QL: NEGATIVE MG/DL — SIGNIFICANT CHANGE UP
HCT VFR BLD CALC: 35 % — LOW (ref 39–50)
HGB BLD-MCNC: 12.1 G/DL — LOW (ref 13–17)
INR BLD: 1.76 RATIO — HIGH (ref 0.88–1.16)
KETONES UR-MCNC: NEGATIVE — SIGNIFICANT CHANGE UP
LEUKOCYTE ESTERASE UR-ACNC: NEGATIVE — SIGNIFICANT CHANGE UP
MCHC RBC-ENTMCNC: 34.2 PG — HIGH (ref 27–34)
MCHC RBC-ENTMCNC: 34.6 GM/DL — SIGNIFICANT CHANGE UP (ref 32–36)
MCV RBC AUTO: 98.9 FL — SIGNIFICANT CHANGE UP (ref 80–100)
MELD SCORE WITH DIALYSIS: 30 POINTS — SIGNIFICANT CHANGE UP
MELD SCORE WITHOUT DIALYSIS: 20 POINTS — SIGNIFICANT CHANGE UP
NITRITE UR-MCNC: NEGATIVE — SIGNIFICANT CHANGE UP
PH UR: 6 — SIGNIFICANT CHANGE UP (ref 5–8)
PLATELET # BLD AUTO: 79 K/UL — LOW (ref 150–400)
PROT UR-MCNC: NEGATIVE — SIGNIFICANT CHANGE UP
PROTHROM AB SERPL-ACNC: 20.5 SEC — HIGH (ref 10.5–13.4)
RBC # BLD: 3.54 M/UL — LOW (ref 4.2–5.8)
RBC # FLD: 15.1 % — HIGH (ref 10.3–14.5)
SODIUM SERPL-SCNC: 141 MMOL/L — SIGNIFICANT CHANGE UP (ref 135–145)
SP GR SPEC: 1.01 — SIGNIFICANT CHANGE UP (ref 1.01–1.02)
UROBILINOGEN FLD QL: 4 MG/DL
WBC # BLD: 5.72 K/UL — SIGNIFICANT CHANGE UP (ref 3.8–10.5)
WBC # FLD AUTO: 5.72 K/UL — SIGNIFICANT CHANGE UP (ref 3.8–10.5)

## 2023-02-16 PROCEDURE — 85610 PROTHROMBIN TIME: CPT

## 2023-02-16 PROCEDURE — C8929: CPT

## 2023-02-16 PROCEDURE — 99285 EMERGENCY DEPT VISIT HI MDM: CPT | Mod: 25

## 2023-02-16 PROCEDURE — 74177 CT ABD & PELVIS W/CONTRAST: CPT | Mod: MA

## 2023-02-16 PROCEDURE — 96376 TX/PRO/DX INJ SAME DRUG ADON: CPT

## 2023-02-16 PROCEDURE — G0480: CPT

## 2023-02-16 PROCEDURE — 82107 ALPHA-FETOPROTEIN L3: CPT

## 2023-02-16 PROCEDURE — 85027 COMPLETE CBC AUTOMATED: CPT

## 2023-02-16 PROCEDURE — 83880 ASSAY OF NATRIURETIC PEPTIDE: CPT

## 2023-02-16 PROCEDURE — 71045 X-RAY EXAM CHEST 1 VIEW: CPT

## 2023-02-16 PROCEDURE — 96374 THER/PROPH/DIAG INJ IV PUSH: CPT

## 2023-02-16 PROCEDURE — 80053 COMPREHEN METABOLIC PANEL: CPT

## 2023-02-16 PROCEDURE — 84484 ASSAY OF TROPONIN QUANT: CPT

## 2023-02-16 PROCEDURE — 81003 URINALYSIS AUTO W/O SCOPE: CPT

## 2023-02-16 PROCEDURE — 87086 URINE CULTURE/COLONY COUNT: CPT

## 2023-02-16 PROCEDURE — 81001 URINALYSIS AUTO W/SCOPE: CPT

## 2023-02-16 PROCEDURE — 86301 IMMUNOASSAY TUMOR CA 19-9: CPT

## 2023-02-16 PROCEDURE — G0378: CPT

## 2023-02-16 PROCEDURE — 85025 COMPLETE CBC W/AUTO DIFF WBC: CPT

## 2023-02-16 PROCEDURE — 74181 MRI ABDOMEN W/O CONTRAST: CPT | Mod: MB

## 2023-02-16 PROCEDURE — 76705 ECHO EXAM OF ABDOMEN: CPT

## 2023-02-16 PROCEDURE — 84443 ASSAY THYROID STIM HORMONE: CPT

## 2023-02-16 PROCEDURE — 85730 THROMBOPLASTIN TIME PARTIAL: CPT

## 2023-02-16 PROCEDURE — 82247 BILIRUBIN TOTAL: CPT

## 2023-02-16 PROCEDURE — 86803 HEPATITIS C AB TEST: CPT

## 2023-02-16 PROCEDURE — 93005 ELECTROCARDIOGRAM TRACING: CPT

## 2023-02-16 PROCEDURE — 36415 COLL VENOUS BLD VENIPUNCTURE: CPT

## 2023-02-16 PROCEDURE — 87637 SARSCOV2&INF A&B&RSV AMP PRB: CPT

## 2023-02-16 PROCEDURE — 99239 HOSP IP/OBS DSCHRG MGMT >30: CPT

## 2023-02-16 PROCEDURE — 82248 BILIRUBIN DIRECT: CPT

## 2023-02-16 RX ORDER — SPIRONOLACTONE 25 MG/1
1 TABLET, FILM COATED ORAL
Qty: 30 | Refills: 0
Start: 2023-02-16 | End: 2023-03-17

## 2023-02-16 RX ORDER — FUROSEMIDE 40 MG
1 TABLET ORAL
Qty: 0 | Refills: 0 | DISCHARGE

## 2023-02-16 RX ORDER — TAMSULOSIN HYDROCHLORIDE 0.4 MG/1
1 CAPSULE ORAL
Qty: 30 | Refills: 0
Start: 2023-02-16 | End: 2023-03-17

## 2023-02-16 RX ORDER — TAMSULOSIN HYDROCHLORIDE 0.4 MG/1
0.4 CAPSULE ORAL AT BEDTIME
Refills: 0 | Status: DISCONTINUED | OUTPATIENT
Start: 2023-02-16 | End: 2023-02-16

## 2023-02-16 RX ADMIN — Medication 40 MILLIGRAM(S): at 05:58

## 2023-02-16 RX ADMIN — SPIRONOLACTONE 25 MILLIGRAM(S): 25 TABLET, FILM COATED ORAL at 05:58

## 2023-02-16 RX ADMIN — LISINOPRIL 5 MILLIGRAM(S): 2.5 TABLET ORAL at 05:57

## 2023-02-16 NOTE — DISCHARGE NOTE PROVIDER - PROVIDER TOKENS
PROVIDER:[TOKEN:[[id number]]],PROVIDER:[TOKEN:[[id number]]],PROVIDER:[TOKEN:[[id number]]]

## 2023-02-16 NOTE — DISCHARGE NOTE PROVIDER - NSDCMRMEDTOKEN_GEN_ALL_CORE_FT
furosemide 20 mg oral tablet: 2 tab(s) orally once a day  lidocaine 4% patch: Apply topically to affected area once a day  losartan 50 mg oral tablet: 1 tab(s) orally 2 times a day  potassium chloride 10 mEq oral tablet, extended release: 2 tab(s) orally once a day  spironolactone 25 mg oral tablet: 1 tab(s) orally once a day  tamsulosin 0.4 mg oral capsule: 1 cap(s) orally once a day (at bedtime)

## 2023-02-16 NOTE — DISCHARGE NOTE NURSING/CASE MANAGEMENT/SOCIAL WORK - PATIENT PORTAL LINK FT
You can access the FollowMyHealth Patient Portal offered by [facility] by registering at the following website: [email or web address]. By joining [facility]’s FollowMyHealth portal, you will also be able to view your health information using other applications (apps) compatible with our system.

## 2023-02-16 NOTE — DISCHARGE NOTE PROVIDER - CARE PROVIDER_API CALL
Ajit Farfan)  Gastroenterology; Internal Medicine  [address] [facility] [facility]  [place] [address]  Phone: [phone]  Fax: [phone]  Follow Up Time:     Ryan Rivera)  Urology  [address] [facility], [address]  [place] [address]  Phone: [phone]  Fax: [phone]  Follow Up Time:     Og Angelo (DO)  Cardiovascular Disease; Internal Medicine  [address] [place] [address]  Phone: [phone]  Fax: [phone]  Follow Up Time:

## 2023-02-16 NOTE — DISCHARGE NOTE PROVIDER - CARE PROVIDERS DIRECT ADDRESSES
[email or web address]

## 2023-02-16 NOTE — DISCHARGE NOTE PROVIDER - ATTENDING DISCHARGE PHYSICAL EXAMINATION:
aaox3 nad   rrr s1s2  ctab  softa bdomen  trace LE edema  nonfocal neuro  ambulatory  gen weakness  ros negative feels at baseline

## 2023-02-16 NOTE — PATIENT PROFILE ADULT - FALL HARM RISK - HARM RISK INTERVENTIONS

## 2023-02-16 NOTE — DISCHARGE NOTE NURSING/CASE MANAGEMENT/SOCIAL WORK - NSDCPEFALRISK_GEN_ALL_CORE
For information on Fall & Injury Prevention, visit: [email or web address] OR  [email or web address] OR  https://www.cdc.gov/steadi/patient.html

## 2023-02-16 NOTE — DISCHARGE NOTE PROVIDER - HOSPITAL COURSE
72yr old male with PMH HTN and bilateral chronic pedal swelling  presents with Chest pain.  ACS ruled out and noted to have advanced liver cirrhosis. seen by GI and outpatient hepatology follow up recommended. started on flomax for BPH symptoms and referred to urology for further management. noted to have pancreatic cysts     stable for dc home.  updated brother over the phone    72yr old male with PMH HTN and bilateral chronic pedal swelling  presents with Chest pain.  ACS ruled out, echo performed ( no further w/u per cardiology) and noted to have advanced liver cirrhosis. seen by GI and outpatient hepatology follow up recommended. started on flomax for BPH symptoms and referred to urology for further management. noted to have pancreatic cysts     stable for dc home.  updated brother over the phone

## 2023-02-17 LAB
CULTURE RESULTS: NO GROWTH — SIGNIFICANT CHANGE UP
SPECIMEN SOURCE: SIGNIFICANT CHANGE UP

## 2023-02-21 ENCOUNTER — EMERGENCY (EMERGENCY)
Facility: HOSPITAL | Age: 73
LOS: 1 days | Discharge: HOME | End: 2023-02-21
Attending: EMERGENCY MEDICINE
Payer: OTHER GOVERNMENT

## 2023-02-21 VITALS
DIASTOLIC BLOOD PRESSURE: 45 MMHG | HEART RATE: 110 BPM | OXYGEN SATURATION: 100 % | RESPIRATION RATE: 19 BRPM | SYSTOLIC BLOOD PRESSURE: 88 MMHG | HEIGHT: 68 IN | WEIGHT: 199.96 LBS

## 2023-02-21 DIAGNOSIS — I20.0 UNSTABLE ANGINA: ICD-10-CM

## 2023-02-21 DIAGNOSIS — Z90.49 ACQUIRED ABSENCE OF OTHER SPECIFIED PARTS OF DIGESTIVE TRACT: Chronic | ICD-10-CM

## 2023-02-21 PROBLEM — I10 ESSENTIAL (PRIMARY) HYPERTENSION: Chronic | Status: ACTIVE | Noted: 2023-02-13

## 2023-02-21 LAB
ALBUMIN SERPL ELPH-MCNC: 2.7 G/DL — LOW (ref 3.3–5.2)
ALP SERPL-CCNC: 135 U/L — HIGH (ref 40–120)
ALT FLD-CCNC: 25 U/L — SIGNIFICANT CHANGE UP
ANION GAP SERPL CALC-SCNC: 15 MMOL/L — SIGNIFICANT CHANGE UP (ref 5–17)
AST SERPL-CCNC: 60 U/L — HIGH
BASOPHILS # BLD AUTO: 0.03 K/UL — SIGNIFICANT CHANGE UP (ref 0–0.2)
BASOPHILS NFR BLD AUTO: 0.7 % — SIGNIFICANT CHANGE UP (ref 0–2)
BILIRUB SERPL-MCNC: 3.7 MG/DL — HIGH (ref 0.4–2)
BUN SERPL-MCNC: 23.7 MG/DL — HIGH (ref 8–20)
CALCIUM SERPL-MCNC: 10.2 MG/DL — SIGNIFICANT CHANGE UP (ref 8.4–10.5)
CHLORIDE SERPL-SCNC: 105 MMOL/L — SIGNIFICANT CHANGE UP (ref 96–108)
CO2 SERPL-SCNC: 21 MMOL/L — LOW (ref 22–29)
CREAT SERPL-MCNC: 1.75 MG/DL — HIGH (ref 0.5–1.3)
EGFR: 41 ML/MIN/1.73M2 — LOW
EOSINOPHIL # BLD AUTO: 0.14 K/UL — SIGNIFICANT CHANGE UP (ref 0–0.5)
EOSINOPHIL NFR BLD AUTO: 3.4 % — SIGNIFICANT CHANGE UP (ref 0–6)
GLUCOSE SERPL-MCNC: 101 MG/DL — HIGH (ref 70–99)
HCT VFR BLD CALC: 36.9 % — LOW (ref 39–50)
HGB BLD-MCNC: 12.5 G/DL — LOW (ref 13–17)
IMM GRANULOCYTES NFR BLD AUTO: 0.5 % — SIGNIFICANT CHANGE UP (ref 0–0.9)
LIDOCAIN IGE QN: 63 U/L — HIGH (ref 22–51)
LYMPHOCYTES # BLD AUTO: 0.65 K/UL — LOW (ref 1–3.3)
LYMPHOCYTES # BLD AUTO: 15.8 % — SIGNIFICANT CHANGE UP (ref 13–44)
MAGNESIUM SERPL-MCNC: 1.8 MG/DL — SIGNIFICANT CHANGE UP (ref 1.6–2.6)
MCHC RBC-ENTMCNC: 33.6 PG — SIGNIFICANT CHANGE UP (ref 27–34)
MCHC RBC-ENTMCNC: 33.9 GM/DL — SIGNIFICANT CHANGE UP (ref 32–36)
MCV RBC AUTO: 99.2 FL — SIGNIFICANT CHANGE UP (ref 80–100)
MONOCYTES # BLD AUTO: 0.44 K/UL — SIGNIFICANT CHANGE UP (ref 0–0.9)
MONOCYTES NFR BLD AUTO: 10.7 % — SIGNIFICANT CHANGE UP (ref 2–14)
NEUTROPHILS # BLD AUTO: 2.83 K/UL — SIGNIFICANT CHANGE UP (ref 1.8–7.4)
NEUTROPHILS NFR BLD AUTO: 68.9 % — SIGNIFICANT CHANGE UP (ref 43–77)
NT-PROBNP SERPL-SCNC: 86 PG/ML — SIGNIFICANT CHANGE UP (ref 0–300)
PLATELET # BLD AUTO: 90 K/UL — LOW (ref 150–400)
POTASSIUM SERPL-MCNC: 4.6 MMOL/L — SIGNIFICANT CHANGE UP (ref 3.5–5.3)
POTASSIUM SERPL-SCNC: 4.6 MMOL/L — SIGNIFICANT CHANGE UP (ref 3.5–5.3)
PROT SERPL-MCNC: 6.3 G/DL — LOW (ref 6.6–8.7)
RBC # BLD: 3.72 M/UL — LOW (ref 4.2–5.8)
RBC # FLD: 15.3 % — HIGH (ref 10.3–14.5)
SODIUM SERPL-SCNC: 141 MMOL/L — SIGNIFICANT CHANGE UP (ref 135–145)
TROPONIN T SERPL-MCNC: 0.02 NG/ML — SIGNIFICANT CHANGE UP (ref 0–0.06)
TROPONIN T SERPL-MCNC: 0.02 NG/ML — SIGNIFICANT CHANGE UP (ref 0–0.06)
WBC # BLD: 4.11 K/UL — SIGNIFICANT CHANGE UP (ref 3.8–10.5)
WBC # FLD AUTO: 4.11 K/UL — SIGNIFICANT CHANGE UP (ref 3.8–10.5)

## 2023-02-21 PROCEDURE — 99244 OFF/OP CNSLTJ NEW/EST MOD 40: CPT

## 2023-02-21 PROCEDURE — 99214 OFFICE O/P EST MOD 30 MIN: CPT

## 2023-02-21 PROCEDURE — 99235 HOSP IP/OBS SAME DATE MOD 70: CPT

## 2023-02-21 PROCEDURE — 71045 X-RAY EXAM CHEST 1 VIEW: CPT | Mod: 26

## 2023-02-21 PROCEDURE — 99204 OFFICE O/P NEW MOD 45 MIN: CPT

## 2023-02-21 PROCEDURE — 71275 CT ANGIOGRAPHY CHEST: CPT | Mod: 26,MA

## 2023-02-21 PROCEDURE — 93010 ELECTROCARDIOGRAM REPORT: CPT

## 2023-02-21 RX ORDER — SPIRONOLACTONE 25 MG/1
25 TABLET, FILM COATED ORAL DAILY
Refills: 0 | Status: DISCONTINUED | OUTPATIENT
Start: 2023-02-21 | End: 2023-02-28

## 2023-02-21 RX ORDER — MORPHINE SULFATE 50 MG/1
2 CAPSULE, EXTENDED RELEASE ORAL ONCE
Refills: 0 | Status: DISCONTINUED | OUTPATIENT
Start: 2023-02-21 | End: 2023-02-21

## 2023-02-21 RX ORDER — TAMSULOSIN HYDROCHLORIDE 0.4 MG/1
0.4 CAPSULE ORAL AT BEDTIME
Refills: 0 | Status: DISCONTINUED | OUTPATIENT
Start: 2023-02-21 | End: 2023-02-28

## 2023-02-21 RX ORDER — LOSARTAN POTASSIUM 100 MG/1
50 TABLET, FILM COATED ORAL
Refills: 0 | Status: DISCONTINUED | OUTPATIENT
Start: 2023-02-21 | End: 2023-02-28

## 2023-02-21 RX ORDER — LIDOCAINE 4 G/100G
1 CREAM TOPICAL ONCE
Refills: 0 | Status: COMPLETED | OUTPATIENT
Start: 2023-02-21 | End: 2023-02-21

## 2023-02-21 RX ORDER — SODIUM CHLORIDE 9 MG/ML
1000 INJECTION INTRAMUSCULAR; INTRAVENOUS; SUBCUTANEOUS ONCE
Refills: 0 | Status: COMPLETED | OUTPATIENT
Start: 2023-02-21 | End: 2023-02-21

## 2023-02-21 RX ADMIN — MORPHINE SULFATE 2 MILLIGRAM(S): 50 CAPSULE, EXTENDED RELEASE ORAL at 14:28

## 2023-02-21 RX ADMIN — SODIUM CHLORIDE 1000 MILLILITER(S): 9 INJECTION INTRAMUSCULAR; INTRAVENOUS; SUBCUTANEOUS at 12:51

## 2023-02-21 RX ADMIN — LIDOCAINE 1 PATCH: 4 CREAM TOPICAL at 22:36

## 2023-02-21 RX ADMIN — TAMSULOSIN HYDROCHLORIDE 0.4 MILLIGRAM(S): 0.4 CAPSULE ORAL at 22:37

## 2023-02-21 NOTE — ED PROVIDER NOTE - CLINICAL SUMMARY MEDICAL DECISION MAKING FREE TEXT BOX
72 year old male presenting with intermittent, sharp midsternal chest pain x 2 hours associated with lightheadedness and shortness of breath. 72 year old male presenting with intermittent, sharp midsternal chest pain x 2 hours associated with lightheadedness and shortness of breath. Patient arrived hypotensive with BP 88/45, likely result of new medications - started on aldactone and tamsulosin on recent admission in addition to receiving SL nitro by EMS, improved appropriately after fluid rehydration to 125/69.     Labs reviewed, Cr increased from 1.33 on 2/15/23 (6 days ago) to 1.75 today- likely overdiuresis in setting of new addition of spironolactone on discharge in addition to his lasix. Trop x 2 negative. CTA Chest demonstrating no PE, per radiology report. Cardiology consulted. Patient recently had TTE 2/14/23 demonstrating LVEF 70 to 75%, they recommend nuclear stress test tomorrow.    Case discussed 72 year old male presenting with intermittent, sharp midsternal chest pain x 2 hours associated with lightheadedness and shortness of breath. Patient arrived hypotensive with BP 88/45, likely result of new medications - started on aldactone and tamsulosin on recent admission in addition to receiving SL nitro by EMS, improved appropriately after fluid rehydration to 125/69.     Labs reviewed, Cr increased from 1.33 on 2/15/23 (6 days ago) to 1.75 today- likely overdiuresis in setting of new addition of spironolactone on discharge in addition to his lasix. Trop x 2 negative. CTA Chest demonstrating no PE, per radiology report. Cardiology consulted. Patient recently had TTE 2/14/23 demonstrating LVEF 70 to 75%, they recommend nuclear stress test tomorrow.    Case discussed with observation team, accept patient to their service for further management.

## 2023-02-21 NOTE — ED ADULT TRIAGE NOTE - CHIEF COMPLAINT QUOTE
pt c/o chest pain and difficulty breathing, seen at [facility] last week for the same.  IVF infusing upon arrival. pt c/o chest pain and difficulty breathing, seen at [facility] last week for the same.  IVF infusing upon arrival.  given 324 ASA and one nitro SL by EMS prior to arrival.

## 2023-02-21 NOTE — ED CDU PROVIDER INITIAL DAY NOTE - CLINICAL SUMMARY MEDICAL DECISION MAKING FREE TEXT BOX
71 yo male hx of htn, newly dx liver cirrhosis, presenting to the ED with Chest pain and SOB that started today, of note pt was hypotensive on arrival with up-trending BP with iv hydration. tachypneic on examination without adventitious breath sounds on exam. significant lower extremity edema. cardiology consulted due to concern for underlying cardiac cause of chest pain although PT NSR on monitor with negative troponin.   will continue to monitor on tele with trending trops.   hold diuretics as per cardiology   Cardiology requesting NST tmrw   - Gi consult placed due to concern for worsening liver cirrhosis causing fluid retention. no ascites to the abdomen appreciated on exam

## 2023-02-21 NOTE — ED ADULT NURSE REASSESSMENT NOTE - NSIMPLEMENTINTERV_GEN_ALL_ED
Implemented All Fall Risk Interventions:  [place] to call system. Call bell, personal items and telephone within reach. Instruct patient to call for assistance. Room bathroom lighting operational. Non-slip footwear when patient is off stretcher. Physically safe environment: no spills, clutter or unnecessary equipment. Stretcher in lowest position, wheels locked, appropriate side rails in place. Provide visual cue, wrist band, yellow gown, etc. Monitor gait and stability. Monitor for mental status changes and reorient to person, place, and time. Review medications for side effects contributing to fall risk. Reinforce activity limits and safety measures with patient and family.

## 2023-02-21 NOTE — ED ADULT NURSE NOTE - NS_NURSE_DISC_TEACHING_YN_ED_ALL_ED
Assessment:     1  Mass of soft tissue of hand    2  Lesion of finger        Plan:     Problem List Items Addressed This Visit        Other    Mass of soft tissue of hand - Primary     Findings consistent with soft tissue mass of left ring finger, suspect hemangioma  Imaging and prognosis reviewed with patient  Patient would like to have removed and will refer to hand specialist to discuss surgical excision  She has no formal restrictions  See as needed  All patient's questions were answered to her satisfaction  This note is created using dictation transcription  It may contain typographical errors, grammatical errors, improperly dictated words, background noise and other errors  Relevant Orders    XR hand 3+ vw left    Ambulatory referral to Hand Surgery          Subjective:     Patient ID: Isidoro Morgan is a 70 y o  female  Chief Complaint:  70 yr old female in for evaluation of possible cyst/cystic lesion of ring finger of left hand  General surgeon Dr Paulo Pollock wanted patient to see hand specialist about this  She is not sure when this lump developed  It is not painful and doesn't restrict and use of the hand  She doesn't like the appearance and would like it removed if possible  Information on patient's intake form was reviewed  Allergy:  Allergies   Allergen Reactions    Tramadol Hives     Medications:  all current active meds have been reviewed  Past Medical History:  Past Medical History:   Diagnosis Date    Arthritis     Chronic pain     Diverticulitis     GERD (gastroesophageal reflux disease)     Hypertension     Vertigo      Past Surgical History:  Past Surgical History:   Procedure Laterality Date    CHOLECYSTECTOMY LAPAROSCOPIC N/A 12/6/2021    Procedure: SUBTOTAL CHOLECYSTECTOMY LAPAROSCOPIC;  Surgeon:  Edwin Loja MD;  Location:  MAIN OR;  Service: General    GALLBLADDER SURGERY  12/2021    Dr Michael Raymond       Family History:  Family History   Problem Relation Age of Onset   Vicki Nine Stroke Mother     Diabetes Mother     Hypertension Mother     Heart disease Father     Hypertension Father     Heart disease Brother     Diabetes Brother     Hypertension Brother     Substance Abuse Neg Hx     Mental illness Neg Hx      Social History:  Social History     Substance and Sexual Activity   Alcohol Use Yes    Comment: 3 times a week     Social History     Substance and Sexual Activity   Drug Use Yes    Types: Marijuana    Comment: rarely     Social History     Tobacco Use   Smoking Status Never Smoker   Smokeless Tobacco Never Used     Review of Systems   Constitutional: Negative for chills and fever  HENT: Negative for ear pain and sore throat  Eyes: Negative for pain and visual disturbance  Respiratory: Negative for cough and shortness of breath  Cardiovascular: Negative for chest pain and palpitations  Gastrointestinal: Negative for abdominal pain and vomiting  Genitourinary: Negative for dysuria and hematuria  Musculoskeletal: Positive for joint swelling (Left ring finger PIP dorsally)  Negative for arthralgias and back pain  Skin: Negative for color change and rash  Neurological: Negative for seizures and syncope  Psychiatric/Behavioral: Negative  All other systems reviewed and are negative  Objective:  BP Readings from Last 1 Encounters:   01/04/22 136/78      Wt Readings from Last 1 Encounters:   01/04/22 83 5 kg (184 lb)      BMI:   Estimated body mass index is 30 62 kg/m² as calculated from the following:    Height as of this encounter: 5' 5" (1 651 m)  Weight as of this encounter: 83 5 kg (184 lb)  BSA:   Estimated body surface area is 1 91 meters squared as calculated from the following:    Height as of this encounter: 5' 5" (1 651 m)  Weight as of this encounter: 83 5 kg (184 lb)  Physical Exam  Vitals and nursing note reviewed  Constitutional:       Appearance: Normal appearance  She is well-developed     HENT:      Head: Normocephalic and atraumatic  Right Ear: External ear normal       Left Ear: External ear normal    Eyes:      Extraocular Movements: Extraocular movements intact  Conjunctiva/sclera: Conjunctivae normal    Pulmonary:      Effort: Pulmonary effort is normal    Musculoskeletal:      Cervical back: Neck supple  Skin:     General: Skin is warm and dry  Neurological:      Mental Status: She is alert and oriented to person, place, and time  Deep Tendon Reflexes: Reflexes are normal and symmetric  Psychiatric:         Mood and Affect: Mood normal          Behavior: Behavior normal        Left Hand Exam     Tenderness   The patient is experiencing no tenderness  Range of Motion   The patient has normal left wrist ROM  Hand   MP Ring: normal   PIP Ring: normal   DIP Rin     Muscle Strength   The patient has normal left wrist strength  Other   Erythema: absent  Scars: absent  Sensation: normal  Pulse: present    Comments:  Small soft fluid filled lump PIP joint ulnar sided  Compression of area lump goes flat and discoloration disappears, then reforms with tingle of darkness   Sensation intact of hand             I have personally reviewed pertinent films in PACS and my interpretation is xr left hand demonstrates no fracture, dislocation of ring finger, soft tissue mass evident ulnar side of PIP joint, significant degenerative changes DIP joint of small finger       Scribe Attestation    I,:  Carlos Marroquin am acting as a scribe while in the presence of the attending physician :       I,:  Zaina King MD personally performed the services described in this documentation    as scribed in my presence : No

## 2023-02-21 NOTE — ED ADULT NURSE REASSESSMENT NOTE - VOIDING
[facility]    Subjective:     Chief Complaint:   Chief Complaint   Patient presents with    Right Knee - Pain     Right knee injection today. Right knee has been swelling    Right Elbow - Pain     Right elbow pain x years       Past Medical History:   Diagnosis Date    Anemia     Cataract     Disorder of kidney and ureter     Hyperlipidemia     Hypertension     Malnutrition     Osteoporosis     PUD (peptic ulcer disease)     Restless leg syndrome     Rheumatoid arthritis     TIA (transient ischemic attack) 1/23/12       Past Surgical History:   Procedure Laterality Date    ADENOIDECTOMY      ESOPHAGOGASTRODUODENOSCOPY (EGD) N/A 6/27/2017    Per[facility] by [facility] at [facility]    ESOPHAGOGASTRODUODENOSCOPY (EGD) N/A 5/11/2017    Per[facility] by [facility] at [facility]    FRACTURE SURGERY      left hip ORIF    HAND SURGERY      HYSTERECTOMY      complete    TONSILLECTOMY         Current Outpatient Medications   Medication Sig    aspirin (ECOTRIN) 81 MG EC tablet Take 162 mg by mouth once daily. 2 at night    cyanocobalamin (VITAMIN B-12) 1000 MCG tablet Take 100 mcg by mouth once daily.    docusate sodium (COLACE) 100 MG capsule Take 100 mg by mouth 2 (two) times daily.    ergocalciferol (ERGOCALCIFEROL) 50,000 unit Cap Take 1 capsule (50,000 Units total) by mouth every 7 days.    fluticasone (FLONASE) 50 mcg/actuation nasal spray 1 spray (50 mcg total) by Each Nare route once daily.    lisinopril (PRINIVIL,ZESTRIL) 20 MG tablet Take 1 tablet (20 mg total) by mouth once daily.    ondansetron (ZOFRAN-ODT) 4 MG TbDL Take 1 tablet (4 mg total) by mouth every 6 (six) hours as needed (nausea).    pantoprazole (PROTONIX) 40 MG tablet Take 1 tablet (40 mg total) by mouth once daily.    pramipexole (MIRAPEX) 0.125 MG tablet Take 1 tablet (0.125 mg total) by mouth nightly as needed (rls).    pravastatin (PRAVACHOL) 10 MG tablet Take 1 tablet (10 mg total) by mouth once  daily.    triamcinolone acetonide 0.1% (KENALOG) 0.1 % cream Apply topically 2 (two) times daily.     No current facility-administered medications for this visit.        Review of patient's allergies indicates:   Allergen Reactions    Antihistamines - alkylamine Other (See Comments)     Other reaction(s): Hallucinations    Hydrocodone Other (See Comments)     Confusion, isolated, abandon    Ibuprofen      Gi bleed, ckd    Plavix [clopidogrel] Hives    Prednisone Hallucinations     Other reaction(s): Hallucinations    Tramadol     Triamcinolone Other (See Comments)     hallucinations    Penicillins Rash     Other reaction(s): Unknown       Family History   Problem Relation Age of Onset    Heart disease Mother     Heart disease Father     Cancer Brother         stomach cancer    No Known Problems Paternal Grandmother     No Known Problems Paternal Grandfather     Cancer Brother         duodenal cancer    Alcohol abuse Brother     Parkinsonism Brother     Alcohol abuse Brother     No Known Problems Son        Social History     Socioeconomic History    Marital status:      Spouse name: Not on file    Number of children: Not on file    Years of education: Not on file    Highest education level: Not on file   Social Needs    Financial resource strain: Not on file    Food insecurity - worry: Not on file    Food insecurity - inability: Not on file    Transportation needs - medical: Not on file    Transportation needs - non-medical: Not on file   Occupational History    Not on file   Tobacco Use    Smoking status: Never Smoker    Smokeless tobacco: Never Used   Substance and Sexual Activity    Alcohol use: No    Drug use: No    Sexual activity: No   Other Topics Concern    Not on file   Social History Narrative    Not on file       History of present illness: Patient comes in today for her right knee and right elbow. She is miserable. She's having a lot of achy pain. She has  tremendous severe rheumatoid arthritis      Review of Systems:    Constitution: Negative for chills, fever, and sweats.  Negative for unexplained weight loss.    HENT:  Negative for headaches and blurry vision.    Cardiovascular:Negative for chest pain or irregular heart beat. Negative for hypertension.    Respiratory:  Negative for cough and shortness of breath.    Gastrointestinal: Negative for abdominal pain, heartburn, melena, nausea, and vomitting.    Genitourinary:  Negative bladder incontinence and dysuria.    Musculoskeletal:  See HPI for details.     Neurological: Negative for numbness.    Psychiatric/Behavioral: Negative for depression.  The patient is not nervous/anxious.      Endocrine: Negative for polyuria    Hematologic/Lymphatic: Negative for bleeding problem.  Does not bruise/bleed easily.    Skin: Negative for poor would healing and rash    Objective:      Physical Examination:    Vital Signs:    Vitals:    01/22/19 0846   BP: (!) 148/80   Pulse: 78       Body mass index is 17.64 kg/m².    This a well-developed, well nourished patient in no acute distress.  They are alert and oriented and cooperative to examination.        Patient has rheumatoid deformities of both her elbows and her knees. She has range of motion of her elbows and knees.  Pertinent New Results:    XRAY Report / Interpretation:   AP and lateral of the right elbow demonstrates tremendous destructive rheumatoid arthritis of the right elbow    Assessment/Plan:      Severe rheumatoid arthritis. I injected her right elbow Depo-Medrol and lidocaine. I injected her right knee with Depo-Medrol and lidocaine. She will follow-up when necessary      This note was created using Dragon voice recognition software that occasionally misinterpreted phrases or words.         without difficulty

## 2023-02-21 NOTE — ED PROVIDER NOTE - PHYSICAL EXAMINATION
Gen: Intermittently in painful distress  Head: normocephalic, atraumatic  EENT: EOMI, PERRLA, moist mucous membranes, no scleral icterus, no JVD  Lung: no increased work of breathing, clear to auscultation bilaterally, no wheezing, rales, rhonchi, speaking in full sentences  CV: tachycardic rate, regular rhythm, normal s1/s2, 2+ radial pulses bilaterally  Abd: soft, non-tender, non-distended, no rebound tenderness or guarding; no CVA tenderness  MSK: (+) +2 pitting edema to b/l LE up to knees, no visible deformities, full range of motion in all 4 extremities  Neuro: Awake, alert, no focal neurologic deficits

## 2023-02-21 NOTE — ED PROVIDER NOTE - OBJECTIVE STATEMENT
72 year old male with PMHx HTN, recent admission 2/14-2/16 where he was diagnosed with advanced liver cirrhosis and BPH, started on spironolactone and flomax, presenting today with acute onset of lightheadedness, shortness of breath, and chest pain 2 hours PTA. [facility], felt okay, returned home and developed all of these symptoms at rest. States chest pain is sharp, midsternal, intermittent, and nonradiating. Received SL nitro x1 and ASA 324mg by EMS with improvement in pain. BP 88/45 on arrival, started on IVF immediately. Denies any recent fevers, new cough, abdominal pain, nausea, vomiting, diarrhea. States b/l LE edema is his baseline.     GI: Dr. Ajit Farfan  Urology: Dr. Rivera  Cardiology: Dr. Angelo 72 year old male with PMHx HTN, recent admission 2/14-2/16 where he was diagnosed with advanced liver cirrhosis and BPH, started on spironolactone and flomax, ECHO done 2/14/23 w/LVEF 70 to 75% presenting today with acute onset of lightheadedness, shortness of breath, and chest pain 2 hours PTA. Patient states was seen [facility] this [facility], felt okay, returned home and developed all of these symptoms at rest. States chest pain is sharp, midsternal, intermittent, and nonradiating. Received SL nitro x1 and ASA 324mg by EMS with improvement in pain. BP 88/45 on arrival, started on IVF immediately. Denies any recent fevers, new cough, abdominal pain, nausea, vomiting, diarrhea. States b/l LE edema is his baseline.     GI: Dr. Ajit Farfan  Urology: Dr. Rivera  Cardiology: Dr. Angelo

## 2023-02-21 NOTE — ED ADULT NURSE NOTE - CHIEF COMPLAINT QUOTE
pt c/o chest pain and difficulty breathing, seen at [facility] last week for the same.  IVF infusing upon arrival.  given 324 ASA and one nitro SL by EMS prior to arrival.

## 2023-02-21 NOTE — ED PROVIDER NOTE - NS ED ROS FT
Gen: No fever, no change in activity level  ENT: No congestion, no rhinorrhea  Resp: No new cough, (+)trouble breathing  Cardiovascular: (+)chest pain, no palpitation  Gastrointestinal: No nausea, no vomiting, no diarrhea  MS: No joint or muscle pain  Neuro: No headache; (+) lightheaded  Remainder negative, except as per the HPI

## 2023-02-21 NOTE — CONSULT NOTE ADULT - SUBJECTIVE AND OBJECTIVE BOX
[facility]                                              CARDIOLOGY AT [place] [address]                                             Telephone: [phone]. Fax:[phone]                                                       CARDIOLOGY CONSULTATION NOTE                                                                                             History obtained by: Patient and medical record  Community Cardiologist: [facility]   obtained: Yes [  ] No [ x ]  Reason for Consultation: Chest pain  Available out pt records reviewed: Yes [ x ] No [  ]    Chief complaint:    Patient is a 72y old  Male who presents with a chief complaint of chest pain     HPI:      CARDIAC TESTING   ECHO:  < from: TTE Echo Complete w/ Contrast w/ Doppler (02.14.23 @ 08:24) >   1. Left ventricular ejection fraction, by visual estimation, is 70 to   75%.   2. Normal global left ventricular systolic function.   3. Increased LV wall thickness.   4. Spectral Doppler shows impaired relaxation pattern of left   ventricular myocardial filling (Grade I diastolic dysfunction).   5. Normal right ventricular size and function.   6. There is no evidence of pericardial effusion.   7. Trace mitral valve regurgitation.   8. Sclerotic aortic valve with normal opening.    MD Troy Electronically signed on 2/14/2023 at 9:15:09 AM    < end of copied text >    STRESS:    CATH:       ELECTROPHYSIOLOGY:     PAST MEDICAL HISTORY  HTN (hypertension)        PAST SURGICAL HISTORY  S/P cholecystectomy        SOCIAL HISTORY:  Denies smoking/alcohol/drugs  CIGARETTES:     ALCOHOL:  DRUGS:    FAMILY HISTORY:  No pertinent family history in first degree relatives      Family History of Cardiovascular Disease:  Yes [  ] No [  ]  Coronary Artery Disease in first degree relative: Yes [  ] No [  ]  Sudden Cardiac Death in First degree relative: Yes [  ] No [  ]    HOME MEDICATIONS:  furosemide 20 mg oral tablet: 2 tab(s) orally once a day (16 Feb 2023 12:42)  lidocaine 4% patch: Apply topically to affected area once a day (14 Feb 2023 13:49)  losartan 50 mg oral tablet: 1 tab(s) orally 2 times a day (14 Feb 2023 13:49)  potassium chloride 10 mEq oral tablet, extended release: 2 tab(s) orally once a day (14 Feb 2023 13:49)      CURRENT CARDIAC MEDICATIONS:      CURRENT OTHER MEDICATIONS:      ALLERGIES:   No Known Allergies      REVIEW OF SYMPTOMS:   CONSTITUTIONAL: No fever, no chills, no weight loss, no weight gain, no fatigue   ENMT:  No vertigo; No sinus or throat pain  NECK: No pain or stiffness  CARDIOVASCULAR: No chest pain, no dyspnea, no syncope/presyncope, no palpitations, no dizziness, no Orthopnea, no Paroxsymal nocturnal dyspnea  RESPIRATORY: no Shortness of breath, no cough, no wheezing  : No dysuria, no hematuria   GI: No dark color stool, no nausea, no diarrhea, no constipation, no abdominal pain   NEURO: No headache, no slurred speech   MUSCULOSKELETAL: No joint pain or swelling; No muscle, back, or extremity pain  PSYCH: No agitation, no anxiety.    ALL OTHER REVIEW OF SYSTEMS ARE NEGATIVE.    VITAL SIGNS:  T(C): 36.3 (02-21-23 @ 12:03), Max: 36.3 (02-21-23 @ 12:03)  T(F): 97.4 (02-21-23 @ 12:03), Max: 97.4 (02-21-23 @ 12:03)  HR: 73 (02-21-23 @ 14:24) (73 - 110)  BP: 125/69 (02-21-23 @ 14:24) (88/45 - 125/69)  RR: 18 (02-21-23 @ 14:24) (18 - 19)  SpO2: 99% (02-21-23 @ 14:24) (99% - 100%)    INTAKE AND OUTPUT:       PHYSICAL EXAM:  Constitutional: Comfortable . No acute distress.   HEENT: Atraumatic and normocephalic , neck is supple . no JVD. No carotid bruit.  CNS: A&Ox3. No focal deficits.   Respiratory: CTAB, unlabored   Cardiovascular: RRR normal s1 s2. No murmur. No rubs or gallop.  Gastrointestinal: Soft, non-tender. +Bowel sounds.   Extremities: 2+ Peripheral Pulses, No clubbing, cyanosis, or edema  Psychiatric: Calm . no agitation.   Skin: Warm and dry, no ulcers on extremities     LABS:  ( 21 Feb 2023 12:00 )  Troponin T  0.02 ,  CPK  X    , CKMB  X    , BNP 86       , ( 14 Feb 2023 05:45 )  Troponin T  <0.01,  CPK  X    , CKMB  X    , BNP X        , ( 13 Feb 2023 12:24 )  Troponin T  <0.01,  CPK  X    , CKMB  X    ,                                 12.5   4.11  )-----------( 90       ( 21 Feb 2023 12:00 )             36.9     02-21    141  |  105  |  23.7<H>  ----------------------------<  101<H>  4.6   |  21.0<L>  |  1.75<H>    Ca    10.2      21 Feb 2023 12:00  Mg     1.8     02-21    TPro  6.3<L>  /  Alb  2.7<L>  /  TBili  3.7<H>  /  DBili  x   /  AST  60<H>  /  ALT  25  /  AlkPhos  135<H>  02-21                INTERPRETATION OF TELEMETRY:     ECG:   Prior ECG: Yes [  ] No [  ]    RADIOLOGY & ADDITIONAL STUDIES:    X-ray:    CT scan:   MRI:   US:                                                [facility]                                              CARDIOLOGY AT [address], [place] [address]                                             Telephone: [phone]. Fax:[phone]                                                       CARDIOLOGY CONSULTATION NOTE                                                                                             History obtained by: Patient and medical record  Community Cardiologist: [facility]   obtained: Yes [  ] No [ x ]  Reason for Consultation: Chest pain  Available out pt records reviewed: Yes [ x ] No [  ]    Chief complaint:    Patient is a 72y old  Male who presents with a chief complaint of chest pain     HPI:  72 year old male with PMHx HTN, recent admission 2/14-2/16 where he was diagnosed with advanced liver cirrhosis and BPH, started on spironolactone and flomax, ECHO done 2/14/23 w/LVEF 70 to 75% presenting today with acute onset of lightheadedness, shortness of breath, and chest pain 2 hours PTA. [facility], felt okay, returned home and developed all of these symptoms at rest. States chest pain is sharp, midsternal, intermittent, and nonradiating. Received SL nitro x1 and ASA 324mg by EMS with improvement in pain. BP 88/45 on arrival, started on IVF immediately. Denies any recent fevers, new cough, abdominal pain, nausea, vomiting, diarrhea. States b/l LE edema is his baseline.     CARDIAC TESTING   ECHO:  < from: TTE Echo Complete w/ Contrast w/ Doppler (02.14.23 @ 08:24) >   1. Left ventricular ejection fraction, by visual estimation, is 70 to   75%.   2. Normal global left ventricular systolic function.   3. Increased LV wall thickness.   4. Spectral Doppler shows impaired relaxation pattern of left   ventricular myocardial filling (Grade I diastolic dysfunction).   5. Normal right ventricular size and function.   6. There is no evidence of pericardial effusion.   7. Trace mitral valve regurgitation.   8. Sclerotic aortic valve with normal opening.    MD Troy Electronically signed on 2/14/2023 at 9:15:09 AM    < end of copied text >    PAST MEDICAL HISTORY  HTN (hypertension)    PAST SURGICAL HISTORY  S/P cholecystectomy    SOCIAL HISTORY:  Denies smoking/alcohol/drugs  CIGARETTES:     ALCOHOL:  DRUGS:    FAMILY HISTORY:  No pertinent family history in first degree relatives    Family History of Cardiovascular Disease:  Yes [  ] No [ x ]  Coronary Artery Disease in first degree relative: Yes [  ] No [ x ]  Sudden Cardiac Death in First degree relative: Yes [  ] No [ x ]    HOME MEDICATIONS:  furosemide 20 mg oral tablet: 2 tab(s) orally once a day (16 Feb 2023 12:42)  lidocaine 4% patch: Apply topically to affected area once a day (14 Feb 2023 13:49)  losartan 50 mg oral tablet: 1 tab(s) orally 2 times a day (14 Feb 2023 13:49)  potassium chloride 10 mEq oral tablet, extended release: 2 tab(s) orally once a day (14 Feb 2023 13:49)    ALLERGIES:   No Known Allergies    REVIEW OF SYMPTOMS:   CONSTITUTIONAL: No fever, no chills, no weight loss, no weight gain, no fatigue   ENMT:  No vertigo; No sinus or throat pain  NECK: No pain or stiffness  CARDIOVASCULAR: No chest pain, no dyspnea, no syncope/presyncope, no palpitations, no dizziness, no Orthopnea, no Paroxsymal nocturnal dyspnea  RESPIRATORY: no Shortness of breath, no cough, no wheezing  : No dysuria, no hematuria   GI: No dark color stool, no nausea, no diarrhea, no constipation, no abdominal pain   NEURO: No headache, no slurred speech   MUSCULOSKELETAL: No joint pain or swelling; No muscle, back, or extremity pain  PSYCH: No agitation, no anxiety.    ALL OTHER REVIEW OF SYSTEMS ARE NEGATIVE.    VITAL SIGNS:  T(C): 36.3 (02-21-23 @ 12:03), Max: 36.3 (02-21-23 @ 12:03)  T(F): 97.4 (02-21-23 @ 12:03), Max: 97.4 (02-21-23 @ 12:03)  HR: 73 (02-21-23 @ 14:24) (73 - 110)  BP: 125/69 (02-21-23 @ 14:24) (88/45 - 125/69)  RR: 18 (02-21-23 @ 14:24) (18 - 19)  SpO2: 99% (02-21-23 @ 14:24) (99% - 100%)    PHYSICAL EXAM:  Constitutional: Comfortable . No acute distress.   HEENT: Atraumatic and normocephalic , neck is supple . no JVD. No carotid bruit.  CNS: A&Ox3. No focal deficits.   Respiratory: CTAB, unlabored   Cardiovascular: RRR normal s1 s2. No murmur. No rubs or gallop.  Gastrointestinal: Soft, non-tender. +Bowel sounds.   Extremities: 2+ Peripheral Pulses, No clubbing, cyanosis, or edema  Psychiatric: Calm . no agitation.   Skin: Warm and dry, no ulcers on extremities     LABS:  ( 21 Feb 2023 12:00 )  Troponin T  0.02 ,  CPK  X    , CKMB  X    , BNP 86       , ( 14 Feb 2023 05:45 )  Troponin T  <0.01,  CPK  X    , CKMB  X    , BNP X        , ( 13 Feb 2023 12:24 )  Troponin T  <0.01,  CPK  X    , CKMB  X    ,                  12.5   4.11  )-----------( 90       ( 21 Feb 2023 12:00 )             36.9     02-21    141  |  105  |  23.7<H>  ----------------------------<  101<H>  4.6   |  21.0<L>  |  1.75<H>    Ca    10.2      21 Feb 2023 12:00  Mg     1.8     02-21    TPro  6.3<L>  /  Alb  2.7<L>  /  TBili  3.7<H>  /  DBili  x   /  AST  60<H>  /  ALT  25  /  AlkPhos  135<H>  02-21    ECG:   < from: 12 Lead ECG (02.13.23 @ 12:17) >  Ventricular Rate 80 BPM    Atrial Rate 80 BPM    P-R Interval 138 ms    QRS Duration 86 ms    Q-T Interval 410 ms    QTC Calculation(Bazett) 472 ms    P Axis 25 degrees    R Axis 8 degrees    T Axis 59 degrees    Diagnosis Line Normal sinus rhythm  Minimal voltage criteria for LVH, may be normal variant  Borderline ECG    Confirmed by BRYCE REYNA ([id number]) on 2/13/2023 1:32:02 PM    < end of copied text >    Prior ECG: Yes [  ] No [ x ]    RADIOLOGY & ADDITIONAL STUDIES:    X-ray:    < from: Xray Chest 1 View- PORTABLE-Urgent (Xray Chest 1 View- PORTABLE-Urgent .) (02.21.23 @ 13:50) >  IMPRESSION: Possible slight right perihilar infiltrate at this time.    --- End of Report ---    < end of copied text >

## 2023-02-21 NOTE — ED ADULT NURSE REASSESSMENT NOTE - BREATH SOUNDS, RIGHT
[FreeTextEntry1] : 1) Annual Physical: Physical exam within normal limits. PHQ-2 screening negative and Pap smear up to date. EKG NSR with no ST segment abnormalities. \par 2) Fatigue: May be from anxiety, r/o organic causes such as TSH, Vitamin B12, Vitamin D deficiency, NILS, CK and Lyme disease. Check ESR and CRP.  diminished

## 2023-02-21 NOTE — ED CDU PROVIDER INITIAL DAY NOTE - ATTENDING APP SHARED VISIT CONTRIBUTION OF CARE
I agree with the PA's note and was available for any issues/concerns. I was directly involved in patient care. My brief overall assessment is as follows:     71 yo male hx of HTN, newly dx liver cirrhosis started on spironolactone and flomax, presenting to the ED today with sob and Chest pain for 2 hours, relieved with nitro and ASA,. Still SOB with minimal activity, no CP. has baseline CKD with slight bump in Cr, trop 0.02 x2, pending stress recommended by cardiology. Concern for sx related to cirrhosis, patient noted to be jaundiced with LE edema and BNP 86. GI consulted.

## 2023-02-21 NOTE — ED ADULT NURSE NOTE - OBJECTIVE STATEMENT
"pt c/o chest pain and difficulty breathing, seen at [facility] last week for the same.  IVF infusing upon arrival.  given 324 ASA and one nitro SL by EMS prior to arrival." Pt in NAD, NSR on monitor 86HR, sats 100% on room air, pt states he feels short of breath. Pt states Nitrol helped initially but pain has returned and also has HA. Lab work drawn in ambu, labs sent to lab.

## 2023-02-21 NOTE — ED PROVIDER NOTE - ATTENDING CONTRIBUTION TO CARE
Pearl: I performed a face to face evaluation of this patient and performed a full history and physical examination on the patient.  I agree with the resident's history, physical examination, and plan of the patient unless otherwise noted. My brief assessment is as follows: pmh as documented, admitted last week for atypical right chest pain with echo showing nl ef, found to have cirrhosis/pancreatic cysts as in system reports with elevated lfts, seen by gi, started on spirnolactone and flomax (with possible chronic bladder outlet obstruction) c/o several hours intermittent mid chest tightness/discomfort with sob/dizziness intermittently. given SL nitro with some mild improvement in cp by ems, arrived with bp ~90/50 and mild tachy. nl sats, clear lungs, chronic b/l LE edema without change, non tender abd, no fluid wave (and no ascites on ct recently). bp improved on exam to >105/60 with fluids and after nitro. given recent diruetics/flomax in setting of nitro may be contributing to hypotension, ekg non ischemic, labs, xr, likely cards, reassess.

## 2023-02-21 NOTE — ED CDU PROVIDER INITIAL DAY NOTE - OBJECTIVE STATEMENT
71 yo male hx of HTN, newly dx liver cirrhosis started on spironolactone and flomax, presenting to the ED today with sob and Chest pain for 2 hours, relieved with nitro and ASA, reports pain as intermittent non radiating, sharp in nature, was hypotensive on arrival. started on IVF with up-trending BP resolved CP at this time. reports swelling to lower extremities worse then he has ever had. had extensive GI eval while admitted 4 days ago. but has not fu outpatient with GI as of yet.  Cardiology recs for holding diuretics at this time. no recent cough cold symptoms. denies abd pain.

## 2023-02-22 VITALS
DIASTOLIC BLOOD PRESSURE: 66 MMHG | OXYGEN SATURATION: 96 % | SYSTOLIC BLOOD PRESSURE: 150 MMHG | RESPIRATION RATE: 18 BRPM | HEART RATE: 72 BPM

## 2023-02-22 DIAGNOSIS — R79.89 OTHER SPECIFIED ABNORMAL FINDINGS OF BLOOD CHEMISTRY: ICD-10-CM

## 2023-02-22 DIAGNOSIS — K86.9 DISEASE OF PANCREAS, UNSPECIFIED: ICD-10-CM

## 2023-02-22 DIAGNOSIS — R07.9 CHEST PAIN, UNSPECIFIED: ICD-10-CM

## 2023-02-22 DIAGNOSIS — K74.60 UNSPECIFIED CIRRHOSIS OF LIVER: ICD-10-CM

## 2023-02-22 PROBLEM — Z00.00 ENCOUNTER FOR PREVENTIVE HEALTH EXAMINATION: Status: ACTIVE | Noted: 2023-02-22

## 2023-02-22 LAB
ALBUMIN SERPL ELPH-MCNC: 2 G/DL — LOW (ref 3.3–5.2)
ALBUMIN SERPL ELPH-MCNC: 2.8 G/DL — LOW (ref 3.3–5.2)
ALBUMIN SERPL ELPH-MCNC: 2.8 G/DL — LOW (ref 3.3–5.2)
ALP SERPL-CCNC: 129 U/L — HIGH (ref 40–120)
ALP SERPL-CCNC: 136 U/L — HIGH (ref 40–120)
ALP SERPL-CCNC: 136 U/L — HIGH (ref 40–120)
ALT FLD-CCNC: 25 U/L — SIGNIFICANT CHANGE UP
ANION GAP SERPL CALC-SCNC: 8 MMOL/L — SIGNIFICANT CHANGE UP (ref 5–17)
ANION GAP SERPL CALC-SCNC: 8 MMOL/L — SIGNIFICANT CHANGE UP (ref 5–17)
APTT BLD: 36.8 SEC — HIGH (ref 27.5–35.5)
AST SERPL-CCNC: 61 U/L — HIGH
AST SERPL-CCNC: 61 U/L — HIGH
AST SERPL-CCNC: 64 U/L — HIGH
BILIRUB DIRECT SERPL-MCNC: 0.7 MG/DL — HIGH (ref 0–0.3)
BILIRUB DIRECT SERPL-MCNC: 0.8 MG/DL — HIGH (ref 0–0.3)
BILIRUB INDIRECT FLD-MCNC: 2.9 MG/DL — HIGH (ref 0.2–1)
BILIRUB INDIRECT FLD-MCNC: 2.9 MG/DL — HIGH (ref 0.2–1)
BILIRUB SERPL-MCNC: 3.6 MG/DL — HIGH (ref 0.4–2)
BUN SERPL-MCNC: 24.7 MG/DL — HIGH (ref 8–20)
BUN SERPL-MCNC: 24.7 MG/DL — HIGH (ref 8–20)
CALCIUM SERPL-MCNC: 9.3 MG/DL — SIGNIFICANT CHANGE UP (ref 8.4–10.5)
CALCIUM SERPL-MCNC: 9.3 MG/DL — SIGNIFICANT CHANGE UP (ref 8.4–10.5)
CHLORIDE SERPL-SCNC: 108 MMOL/L — SIGNIFICANT CHANGE UP (ref 96–108)
CHLORIDE SERPL-SCNC: 108 MMOL/L — SIGNIFICANT CHANGE UP (ref 96–108)
CO2 SERPL-SCNC: 23 MMOL/L — SIGNIFICANT CHANGE UP (ref 22–29)
CO2 SERPL-SCNC: 23 MMOL/L — SIGNIFICANT CHANGE UP (ref 22–29)
CREAT SERPL-MCNC: 1.47 MG/DL — HIGH (ref 0.5–1.3)
CREAT SERPL-MCNC: 1.47 MG/DL — HIGH (ref 0.5–1.3)
EGFR: 50 ML/MIN/1.73M2 — LOW
EGFR: 50 ML/MIN/1.73M2 — LOW
GLUCOSE SERPL-MCNC: 73 MG/DL — SIGNIFICANT CHANGE UP (ref 70–99)
GLUCOSE SERPL-MCNC: 73 MG/DL — SIGNIFICANT CHANGE UP (ref 70–99)
INR BLD: 1.65 RATIO — HIGH (ref 0.88–1.16)
PHOSPHATIDYLETHANOL (PETH) - RESULT: NEGATIVE NG/ML — SIGNIFICANT CHANGE UP
POTASSIUM SERPL-MCNC: 4.4 MMOL/L — SIGNIFICANT CHANGE UP (ref 3.5–5.3)
POTASSIUM SERPL-MCNC: 4.4 MMOL/L — SIGNIFICANT CHANGE UP (ref 3.5–5.3)
POTASSIUM SERPL-SCNC: 4.4 MMOL/L — SIGNIFICANT CHANGE UP (ref 3.5–5.3)
POTASSIUM SERPL-SCNC: 4.4 MMOL/L — SIGNIFICANT CHANGE UP (ref 3.5–5.3)
PROT SERPL-MCNC: 6 G/DL — LOW (ref 6.6–8.7)
PROT SERPL-MCNC: 6.4 G/DL — LOW (ref 6.6–8.7)
PROT SERPL-MCNC: 6.4 G/DL — LOW (ref 6.6–8.7)
PROTHROM AB SERPL-ACNC: 19.2 SEC — HIGH (ref 10.5–13.4)
SODIUM SERPL-SCNC: 139 MMOL/L — SIGNIFICANT CHANGE UP (ref 135–145)
SODIUM SERPL-SCNC: 139 MMOL/L — SIGNIFICANT CHANGE UP (ref 135–145)
TROPONIN T SERPL-MCNC: 0.01 NG/ML — SIGNIFICANT CHANGE UP (ref 0–0.06)

## 2023-02-22 PROCEDURE — 80076 HEPATIC FUNCTION PANEL: CPT

## 2023-02-22 PROCEDURE — 93005 ELECTROCARDIOGRAM TRACING: CPT

## 2023-02-22 PROCEDURE — 36415 COLL VENOUS BLD VENIPUNCTURE: CPT

## 2023-02-22 PROCEDURE — 85730 THROMBOPLASTIN TIME PARTIAL: CPT

## 2023-02-22 PROCEDURE — 80048 BASIC METABOLIC PNL TOTAL CA: CPT

## 2023-02-22 PROCEDURE — 71045 X-RAY EXAM CHEST 1 VIEW: CPT

## 2023-02-22 PROCEDURE — 84484 ASSAY OF TROPONIN QUANT: CPT

## 2023-02-22 PROCEDURE — 99285 EMERGENCY DEPT VISIT HI MDM: CPT | Mod: 25

## 2023-02-22 PROCEDURE — A9500: CPT

## 2023-02-22 PROCEDURE — 85610 PROTHROMBIN TIME: CPT

## 2023-02-22 PROCEDURE — 96374 THER/PROPH/DIAG INJ IV PUSH: CPT | Mod: XU

## 2023-02-22 PROCEDURE — 93018 CV STRESS TEST I&R ONLY: CPT

## 2023-02-22 PROCEDURE — 99283 EMERGENCY DEPT VISIT LOW MDM: CPT

## 2023-02-22 PROCEDURE — G0378: CPT

## 2023-02-22 PROCEDURE — 83880 ASSAY OF NATRIURETIC PEPTIDE: CPT

## 2023-02-22 PROCEDURE — 78452 HT MUSCLE IMAGE SPECT MULT: CPT

## 2023-02-22 PROCEDURE — 83735 ASSAY OF MAGNESIUM: CPT

## 2023-02-22 PROCEDURE — 93017 CV STRESS TEST TRACING ONLY: CPT

## 2023-02-22 PROCEDURE — 83690 ASSAY OF LIPASE: CPT

## 2023-02-22 PROCEDURE — 80053 COMPREHEN METABOLIC PANEL: CPT

## 2023-02-22 PROCEDURE — 99282 EMERGENCY DEPT VISIT SF MDM: CPT

## 2023-02-22 PROCEDURE — 71275 CT ANGIOGRAPHY CHEST: CPT | Mod: MA

## 2023-02-22 PROCEDURE — 78452 HT MUSCLE IMAGE SPECT MULT: CPT | Mod: 26

## 2023-02-22 PROCEDURE — 93016 CV STRESS TEST SUPVJ ONLY: CPT

## 2023-02-22 PROCEDURE — 85025 COMPLETE CBC W/AUTO DIFF WBC: CPT

## 2023-02-22 RX ADMIN — LIDOCAINE 1 PATCH: 4 CREAM TOPICAL at 11:15

## 2023-02-22 RX ADMIN — LOSARTAN POTASSIUM 50 MILLIGRAM(S): 100 TABLET, FILM COATED ORAL at 05:34

## 2023-02-22 RX ADMIN — SPIRONOLACTONE 25 MILLIGRAM(S): 25 TABLET, FILM COATED ORAL at 05:34

## 2023-02-22 NOTE — CONSULT NOTE ADULT - NS ATTEND AMEND GEN_ALL_CORE FT
Patient seen and examined by me.    T(C): 36.3 (02-21-23 @ 12:03), Max: 36.3 (02-21-23 @ 12:03)  HR: 73 (02-21-23 @ 14:24) (73 - 110)  BP: 125/69 (02-21-23 @ 14:24) (88/45 - 125/69)  RR: 18 (02-21-23 @ 14:24) (18 - 19)  SpO2: 99% (02-21-23 @ 14:24) (99% - 100%)  Patient alert and awake.  Chest- Bilateral Clear BS  Cardiac- S1 and S2  Abdomen- Soft    Assessment:  1. Chest pain      Recommendations:  1. For Nuclear Stress Test in AM        I have discussed my recommendation with the PA which are outlined above.  Will follow.
Presented with CP, cardiology working up for angina. c/s for underlying cirrhosis (recent work up), stable from liver perspective. Requires significant outpatient work up from liver, pancreatic and age appropriate cancer screening perspective as detailed in PA note. [facility], hep information provided for [place] also.  Will sign off, please call with questions.

## 2023-02-22 NOTE — ED CDU PROVIDER DISPOSITION NOTE - CARE PROVIDERS DIRECT ADDRESSES
[email or web address]

## 2023-02-22 NOTE — ED CDU PROVIDER DISPOSITION NOTE - CARE PROVIDER_API CALL
Rasheeda Zavala)  Cardiology; Cardiovascular Disease; Internal Medicine  [address] [place] [address]  Phone: [phone]  Fax: [phone]  Follow Up Time:     Ajit Farfan)  Gastroenterology; Internal Medicine  [address] [place] [address]  Phone: [phone]  Fax: [phone]  Follow Up Time:

## 2023-02-22 NOTE — ED CDU PROVIDER SUBSEQUENT DAY NOTE - PHYSICAL EXAMINATION
Const: Awake, alert and oriented. tachypneic. sitting up eating dinner   HEENT: NC/AT. Moist mucous membranes.  Cardiac: RR2+ pitting edema bilaterally. no calf tenderness bilaterally  Resp: Speaking in full sentences. tachypneic. No wheezes, rales or rhonchi.  Abd: Soft, non-tender, non-distended. No guarding or rebound.  Back: Spine midline and non-tender. No CVAT.  Skin jaundice   Neuro: Awake, alert & oriented x 3. Moves all extremities symmetrically.

## 2023-02-22 NOTE — CONSULT NOTE ADULT - PROBLEM SELECTOR RECOMMENDATION 9
MRCP 2/15/23- cirrhotic liver, splenic varices, dilated CBD 1.1cm possibly related to cholecystectomy status, no choledocholithiasis or obstructing mass  AFP 3.3, unable to calculate MELD without coags     - Trend renal function, LFTs, electrolytes, coags daily  - Agree with holding diuretics for now, monitor renal function and electrolytes, monitor BP  - Avoid NSAIDs, hepatotoxic drugs  - Check hepatitis panel   - MVI, thiamine and folic acid. Optimize nutrition, ensure adequate protein intake, low sodium.  - EGD to assess for varices, also due for age appropriate screening colonoscopy, can be done as an outpatient.  - Will need follow up with Hepatologist, either at [facility] or with our hepatologist Dr. Dumont, he will need transplant evaluation.
- admitted here last week  - found to have volume overload not in setting of cardiogenic etiology   - EF normal, 70-75%, grade 1 DD, trace MR  - will check CTA chest r/o PE  - check nuclear stress test in AM, keep NPO @ [facility]  - pt's chest pain is associated after eating and lasted for 20 minutes, non radiating, not associated with shortness of breath, diaphoresis, palpitations, etc.   - troponin is negative but trend x 2 more sets Q6h   - lower suspicion for ACS, will check stress as last stress is 6 years ago and has not had cardiac workup  - non cardiac etiologies suspected  - keep in observation overnight on telemetry   - EKG and cardiac enzymes for any c/o chest pain   - avoid nitroglycerin, pt received and became hypotensive and chest pain was not alleviated   - plan of care TBD based upon stress results in AM   - cardiology will follow  - pt w/ lymphedema in LE, use ace wraps, avoid diuresis, pBNP is normal and edema is of non-cardiac etiology also pt w/ worsening DEANDRE, avoid nephrotoxins

## 2023-02-22 NOTE — ED CDU PROVIDER DISPOSITION NOTE - PATIENT PORTAL LINK FT
You can access the FollowMyHealth Patient Portal offered by [facility] by registering at the following website: [email or web address]. By joining [employer]’s FollowMyHealth portal, you will also be able to view your health information using other applications (apps) compatible with our system.

## 2023-02-22 NOTE — CONSULT NOTE ADULT - SUBJECTIVE AND OBJECTIVE BOX
Patient is a 72y old  Male who presents with a chief complaint of     HPI: 71 y/o M with PMHx HTN, recent admission 2/14-2/16 where he was diagnosed with advanced liver cirrhosis and BPH, started on spironolactone and flomax, presenting with acute onset of lightheadedness, shortness of breath, and chest pain. At this time he reports his chest pain has improved but is still feeling short of breath. He is currently at stress testing. He feels his lower extremity edema is at baseline. Reports compliance with diuretics. 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 No family history of liver disease or GI cancers. Denies fever/chills, abdominal pain, nausea, vomiting, hematemesis, hematochezia, melena.     Labs remarkable for platelet 90k, albumin 2.8, T bili 3.6, AST 61, ALT 25, Alk phos 136. No coags available at this time. CTA chest showed no PE, cirrhosis of liver, perisplenic varices and splenorenal shunt. 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 AFP 3.3.     PAST MEDICAL & SURGICAL HISTORY:  HTN (hypertension)      Cirrhosis      S/P cholecystectomy          Allergies    No Known Allergies    Intolerances        MEDICATIONS  (STANDING):  losartan 50 milliGRAM(s) Oral two times a day  spironolactone 25 milliGRAM(s) Oral daily  tamsulosin 0.4 milliGRAM(s) Oral at bedtime    MEDICATIONS  (PRN):      Social History:    Marital Status:  (   )    (   ) Single    (   )    (  )   Occupation:   Lives with: (  ) alone  (  ) children   (  ) spouse   (  ) parents  (  ) other    Substance Use (street drugs): (  ) never used  (  ) other:  Tobacco Usage:  (   ) never smoked   (   ) former smoker   (   ) current smoker  (     ) pack year  (        ) last cigarette date  Alcohol Usage:  Sexual History:     Family History   IBD (  ) Yes   (  ) No  GI Malignancy (  )  Yes    (  ) No    Health Management     Last Colonoscopy -      (     ) Advanced Directives: (     ) None    (      ) DNR    (     ) DNI    (     ) Health Care Proxy:       REVIEW OF SYSTEMS:   General: Negative  HEENT: Negative  CV: Negative  Respiratory: Negative  GI: See HPI  : Negative  MSK: Negative  Hematologic: Negative  Skin: Negative      Vital Signs Last 24 Hrs  T(C): 36.6 (22 Feb 2023 03:14), Max: 36.6 (22 Feb 2023 03:14)  T(F): 97.8 (22 Feb 2023 03:14), Max: 97.8 (22 Feb 2023 03:14)  HR: 75 (22 Feb 2023 03:14) (73 - 110)  BP: 118/73 (22 Feb 2023 03:14) (88/45 - 145/74)  BP(mean): --  RR: 17 (22 Feb 2023 03:14) (17 - 19)  SpO2: 97% (22 Feb 2023 03:14) (97% - 100%)    Parameters below as of 21 Feb 2023 20:27  Patient On (Oxygen Delivery Method): room air        PHYSICAL EXAM:   GENERAL:  No acute distress  HEENT:  NC/AT, conjunctiva clear, sclera anicteric  CHEST:  No increased effort, breath sounds clear  HEART:  Regular rate   ABDOMEN:  Soft, non-tender, non-distended, normoactive bowel sounds, no rebound or guarding  EXTREMITIES: 2+ b/l edema  SKIN:  Warm, dry  NEURO:  Calm, cooperative        LABS:                        12.5   4.11  )-----------( 90       ( 21 Feb 2023 12:00 )             36.9     02-22    139  |  108  |  24.7<H>  ----------------------------<  73  4.4   |  23.0  |  1.47<H>    Ca    9.3      22 Feb 2023 05:45  Mg     1.8     02-21    TPro  6.4<L>  /  Alb  2.8<L>  /  TBili  3.6<H>  /  DBili  0.8<H>  /  AST  61<H>  /  ALT  25  /  AlkPhos  136<H>  02-22        LIVER FUNCTIONS - ( 22 Feb 2023 05:45 )  Alb: 2.8 g/dL / Pro: 6.4 g/dL / ALK PHOS: 136 U/L / ALT: 25 U/L / AST: 61 U/L / GGT: x             RADIOLOGY & ADDITIONAL TESTS:    < from: CT Angio Chest PE Protocol w/ IV Cont (02.21.23 @ 15:48) >    ACC: [id number] EXAM:  CT ANGIO CHEST PULM [facility]   ORDERED BY: ISIAH NARAYANAN     PROCEDURE DATE:  02/21/2023          INTERPRETATION:  CTA CHEST    INDICATION: Chest pain. Tachycardia. Lightheadedness. Recent admission.   Evaluate for pulmonaryembolus.    TECHNIQUE: Enhanced helical images were obtained of the chest. Coronal   and sagittal images were reconstructed.  Images were obtained after the   uneventful administration of 49 cc of nonionic intravenous contrast   (Omnipaque 350). The C1 cc of nonionic intravenous contrast (Omnipaque   350) was discarded. Maximum intensity projection images were generated.    COMPARISON: Radiograph chest 2/21/2023. CT abdomen and pelvis 2/13/2023.    FINDINGS:    Pulmonary Artery:  No main, lobar, or proximal to mid segmental pulmonary   embolus. The distal segmental and subsegmental divisions are not well   evaluated, particularly within the bilateral lower lobes.    Tubes/Lines: None.    Lungs, airways and pleura: Bilateral lower lobe and lingular linear   atelectasis. The remainder of the lungs are clear. Included airways are   normal. The pleura is normal.    Mediastinum: The thyroid gland is normal. The chest lymph nodes measure   less than 15 mm in the short axis. The esophagus is unremarkable.    The heart is normal in size. No pericardial effusion. Aortic root   measures 4.0 cm at the sinuses of Valsalva. Ascending aorta measures 3.2   cm at the main pulmonary artery. The aorta is tortuous.    Upper Abdomen: Hypodense right renal lesions are statistically benign and   likely represent cysts. Cirrhotic morphology of the liver. There are   perisplenic varices and likely a splenorenal shunt.    Bones And Soft Tissues: Spondylosis of the thoracic spine.  The soft   tissues are unremarkable.      IMPRESSION:    1.  No pulmonary embolus.    --- End of Report ---            JOHNNA BUSTAMANTE MD; Attending Radiologist  This document has been electronically signed. Feb 21 2023  4:03PM    < end of copied text >    < from: MR MRCP No Cont (02.15.23 @ 03:38) >    ACC: [id number] EXAM:  MR MRCP   ORDERED BY: JOSE JANE     PROCEDURE DATE:  02/15/2023          INTERPRETATION:  CLINICAL INFORMATION: Right upper quadrant pain.   Elevated LFTs and cirrhosis.    COMPARISON: No prior MRIs available for comparison. Correlation is made   to CT scan of 2/13/2023 sonogram of 2/14/2023..    CONTRAST/COMPLICATIONS:  IV Contrast: NONE  Oral Contrast: NONE  Complications: None reported at time of study completion    PROCEDURE:  MRI of the abdomen was performed.  MRCP was performed.    FINDINGS: Suboptimal evaluation due to motion artifact throughout the   exam.    LIVER: Shrunken and cirrhotic morphology liver. Evaluation for focal   masses is limited without contrast material..  BILE DUCTS: Common bile duct is dilated measuring 1.1 cm. This tapers   smoothly to the level of the ampulla. There is no: No cholelithiasis   demonstrated. There is no obstructing mass.  GALLBLADDER: Cholecystectomy.  SPLEEN: Within normal limits.  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 The largest lesion measures up to   1.4 cm. Pancreatic duct is within normal limits. Additional punctate   cystic lesions of the pancreas are also suggested..  ADRENALS: Within normal limits.  KIDNEYS/URETERS: Right-sided subcentimeter renal cysts are appreciated.   There is a 3 mm T2 hypointense and T1 hyperintense cortical lesion of the   right interpolar kidney, likely tiny hemorrhagic cyst. There is no   urinary tract obstruction.    VISUALIZED PORTIONS:  BOWEL: Within normal limits.  PELVIS: Diffusely thick-walled and trabeculated urinary bladder. Prostate   gland is enlarged.  PERITONEUM: No ascites.  VESSELS: Extensive splenic varices are appreciated..  RETROPERITONEUM/LYMPH NODES: No lymphadenopathy.  ABDOMINAL WALL: Soft tissue edema.  BONES: Curvature and degenerative changes of the spine appreciated..    IMPRESSION:  Suboptimal evaluation due to extensive motion artifact.    Cirrhotic liver. Please note evaluation for focal masses is limited in   the absence of IV contrast. There are associated splenic varices.    Dilated common bile duct, possibly related to cholecystectomy status.   There is no choledocholithiasis or obstructing mass.    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    Thick-walled urinary bladder likely related sequelae of chronic outlet   obstruction from prostate enlargement. Cystitis will appears similar   correlation with urinalysis is advised.    Generalized soft tissue edema.    Additional findings as above.            --- End of Report ---            KENDALL ALEXIS MD; Attending Radiologist  This document has been electronically signed. Feb 15 2023  4:18AM    < end of copied text >

## 2023-02-22 NOTE — ED CDU PROVIDER SUBSEQUENT DAY NOTE - CLINICAL SUMMARY MEDICAL DECISION MAKING FREE TEXT BOX
71 yo male hx of htn, newly dx liver cirrhosis, presenting to the ED with Chest pain and SOB that started today, of note pt was hypotensive on arrival with up-trending BP with iv hydration. tachypneic on examination without adventitious breath sounds on exam. significant lower extremity edema. cardiology consulted due to concern for underlying cardiac cause of chest pain although PT NSR on monitor with negative troponin.   will continue to monitor on tele with trending trops.   hold diuretics as per cardiology   Cardiology requesting NST tmrw   - Gi consult placed by night team  due to concern for worsening liver cirrhosis though no abd pain or gi complaints and labs relatively unchange.d

## 2023-02-22 NOTE — ED CDU PROVIDER DISPOSITION NOTE - CLINICAL COURSE
pt is a 73 y/o male with a hx of HTN, newly dx liver cirrhosis started on spironolactone and flomax, that presented to the ed for sob and chest pain for two hours, pt placed in observation for cardiology consult - nuclear stress test obtained normal, echo preformed recently past [facility] ed visit one week ago, GI consulted for cirrhosis, pt was recently admitted at [facility] for newly dx liver cirrhosis - pt to follow up outpatient with GI, pt states symptoms resolved, pt explained dc instructions

## 2023-02-22 NOTE — PROGRESS NOTE ADULT - PROBLEM SELECTOR PLAN 1
- admitted here last week  - found to have volume overload not in setting of cardiogenic etiology   - EF normal, 70-75%, grade 1 DD, trace MR  - CTA negative for PE   - check nuclear stress test today   - pt's chest pain is associated after eating and lasted for 20 minutes, non radiating, not associated with shortness of breath, diaphoresis, palpitations, etc.   - troponin is negative   - lower suspicion for ACS, will check stress as last stress is 6 years ago and has not had cardiac workup  - non cardiac etiologies suspected  - keep in observation overnight on telemetry   - EKG and cardiac enzymes for any c/o chest pain   - avoid nitroglycerin, pt received and became hypotensive and chest pain was not alleviated   - plan of care TBD based upon stress results   - pt w/ lymphedema in LE, use ace wraps, avoid diuresis, pBNP is normal and edema is of non-cardiac etiology also pt w/ worsening DEANDRE, avoid nephrotoxins.

## 2023-02-22 NOTE — ED CDU PROVIDER DISPOSITION NOTE - NS ED ATTENDING STATEMENT MOD
This was a shared visit with the JOSE GUADALUPE. I reviewed and verified the documentation and independently performed the documented: Attending with

## 2023-02-22 NOTE — ED CDU PROVIDER DISPOSITION NOTE - ATTENDING APP SHARED VISIT CONTRIBUTION OF CARE
I agree with the PA's note and was available for any issues/concerns. I was directly involved in patient care. My brief overall assessment is as follows: advanced cirrhosis with episodic chest pain, nst without acute findings. symptoms improved cleared by cards/gi for outpt f/u.

## 2023-02-22 NOTE — CONSULT NOTE ADULT - PROBLEM SELECTOR RECOMMENDATION 4
- Workup per primary team/ cardiology  _________________________________________________________________  Assessment and recommendations are final when note is signed by the attending physician.

## 2023-02-22 NOTE — PROGRESS NOTE ADULT - ASSESSMENT
72 year old male with PMHx HTN, recent admission 2/14-2/16 where he was diagnosed with advanced liver cirrhosis and BPH, started on spironolactone and flomax, ECHO done 2/14/23 w/LVEF 70 to 75% presenting today with acute onset of lightheadedness, shortness of breath, and chest pain 2 hours PTA. [facility], felt okay, returned home and developed all of these symptoms at rest. States chest pain is sharp, midsternal, intermittent, and non-radiating. Received SL nitro x1 and ASA 324mg by EMS with improvement in pain. BP 88/45 on arrival, started on IVF immediately. Denies any recent fevers, new cough, abdominal pain, nausea, vomiting, diarrhea. States b/l LE edema is his baseline.

## 2023-02-22 NOTE — CONSULT NOTE ADULT - ASSESSMENT
71 y/o M with PMHx HTN, recent admission 2/14-2/16 where he was diagnosed with advanced liver cirrhosis and BPH, started on spironolactone and flomax, presenting with acute onset of lightheadedness, shortness of breath, and chest pain    
72 year old male with PMHx HTN, recent admission 2/14-2/16 where he was diagnosed with advanced liver cirrhosis and BPH, started on spironolactone and flomax, ECHO done 2/14/23 w/LVEF 70 to 75% presenting today with acute onset of lightheadedness, shortness of breath, and chest pain 2 hours PTA. Patient states was seen [facility] this [facility], felt okay, returned home and developed all of these symptoms at rest. States chest pain is sharp, midsternal, intermittent, and non-radiating. Received SL nitro x1 and ASA 324mg by EMS with improvement in pain. BP 88/45 on arrival, started on IVF immediately. Denies any recent fevers, new cough, abdominal pain, nausea, vomiting, diarrhea. States b/l LE edema is his baseline.

## 2023-02-22 NOTE — ED ADULT NURSE REASSESSMENT NOTE - NS ED NURSE REASSESS COMMENT FT1
Assumed care of patient from RN León, patient in nuclear med upon receiving care. Will assess when patient returns.
Assumed care of pt from previous RN. Pt presents to ED c/o chest pain. A/O x4. Respirations are even and unlabored on room air. Cardiac monitor in place. Pt offers no complaints of pain or discomfort at this time. Pt to be NPO after midnight and awaiting nuclear stress test in the AM. Educated on plan of care and expresses understanding.
Report given to Aki WAGNER. Pt transported to observation unit with belongings.
pt returned from nuclear. No distress noted. NSR noted to monitor, cardiologist at bedside. Denies any chest pains, denies SOBB. Awaiting results.
Pt received from JONO Wasserman RN. Pt AAOX3-4. S/S of admitting chest pain and SOB resolved. Trop negative times two. Pending third troponin at 0000. Safety measures explained and verbalized understanding.  NPO status maintained for pending stress test procedure in AM. Pt aware and verbalized understanding and agree ance to plan of care. VS charted and stable.

## 2023-02-22 NOTE — PROGRESS NOTE ADULT - SUBJECTIVE AND OBJECTIVE BOX
[facility]                                                         CARDIOLOGY AT [facility]                                                                  [address] [facility], [facility] [place] [address]                                                         Telephone: [phone]. Fax:[phone]                                                                             PROGRESS NOTE    Reason for follow up: CAD  Update: Stress today     Review of symptoms:   Cardiac:  No chest pain. No dyspnea. No palpitations.  Respiratory: no cough. No dyspnea  Gastrointestinal: No diarrhea. No abdominal pain. No bleeding.   Neuro: No focal neuro complaints.    Vitals:  T(C): 37.1 (02-22-23 @ 11:15), Max: 37.1 (02-22-23 @ 11:15)  HR: 81 (02-22-23 @ 11:15) (73 - 110)  BP: 114/55 (02-22-23 @ 11:15) (88/45 - 145/74)  RR: 18 (02-22-23 @ 11:15) (17 - 19)  SpO2: 98% (02-22-23 @ 11:15) (97% - 100%)  Wt(kg): --  I&O's Summary    Weight (kg): 90.7 (02-21 @ 11:49)    PHYSICAL EXAM:  Appearance: Comfortable. No acute distress  HEENT:  Atraumatic. Normocephalic.  Normal oral mucosa  Neurologic: A & O x 3, no gross focal deficits.  Cardiovascular: RRR S1 S2, No murmur, no rubs/gallops. No JVD  Respiratory: Lungs clear to auscultation, unlabored   Gastrointestinal:  Soft, Non-tender, + BS  Lower Extremities: 2+ Peripheral Pulses, No clubbing, cyanosis, or edema  Psychiatry: Patient is calm. No agitation.   Skin: warm and dry.    CURRENT CARDIAC MEDICATIONS:  losartan 50 milliGRAM(s) Oral two times a day  spironolactone 25 milliGRAM(s) Oral daily      CURRENT OTHER MEDICATIONS:  tamsulosin 0.4 milliGRAM(s) Oral at bedtime      LABS:	 	  ( 22 Feb 2023 01:00 )  Troponin T  0.01 ,  CPK  X    , CKMB  X    , BNP X        , ( 21 Feb 2023 16:00 )  Troponin T  0.02 ,  CPK  X    , CKMB  X    , BNP X        , ( 21 Feb 2023 12:00 )  Troponin T  0.02 ,  CPK  X    , CKMB  X    , BNP 86                                 12.5   4.11  )-----------( 90       ( 21 Feb 2023 12:00 )             36.9     02-22    139  |  108  |  24.7<H>  ----------------------------<  73  4.4   |  23.0  |  1.47<H>    Ca    9.3      22 Feb 2023 05:45  Mg     1.8     02-21    TPro  6.4<L>  /  Alb  2.8<L>  /  TBili  3.6<H>  /  DBili  0.8<H>  /  AST  61<H>  /  ALT  25  /  AlkPhos  136<H>  02-22    PT/INR/PTT ( 16 Feb 2023 05:53 )                       :                       :      20.5         :       X                     .        .                   .              .           .       1.76        .

## 2023-02-22 NOTE — ED CDU PROVIDER DISPOSITION NOTE - NSFOLLOWUPINSTRUCTIONS_ED_ALL_ED_FT
patient to follow up with Gastroenterology and cardiology outpatient      Nonspecific Chest Pain, Adult      Chest pain is an uncomfortable, tight, or painful feeling in the chest. The pain can feel like a crushing, aching, or squeezing pressure. A person can feel a burning or tingling sensation. Chest pain can also be felt in your back, neck, jaw, shoulder, or arm. This pain can be worse when you move, sneeze, or take a deep breath.    Chest pain can be caused by a condition that is life-threatening. This must be treated right away. It can also be caused by something that is not life-threatening. If you have chest pain, it can be hard to know the difference, so it is important to get help right away to make sure that you do not have a serious condition.    Some life-threatening causes of chest pain include:  •Heart attack.      •A tear in the body's main blood vessel (aortic dissection).      •Inflammation around your heart (pericarditis).      •A problem in the lungs, such as a blood clot (pulmonary embolism) or a collapsed lung (pneumothorax).      Some non life-threatening causes of chest pain include:  •Heartburn.      •Anxiety or stress.      •Damage to the bones, muscles, and cartilage that make up your chest wall.      •Pneumonia or bronchitis.      •Shingles infection (varicella-zoster virus).      Your chest pain may come and go. It may also be constant. Your health care provider will do tests and other studies to find the cause of your pain. Treatment will depend on the cause of your chest pain.      Follow these instructions at home:    Medicines     •Take over-the-counter and prescription medicines only as told by your health care provider.      •If you were prescribed an antibiotic medicine, take it as told by your health care provider. Do not stop taking the antibiotic even if you start to feel better.      Activity     •Avoid any activities that cause chest pain.      • Do not lift anything that is heavier than 10 lb (4.5 kg), or the limit that you are told, until your health care provider says that it is safe.      •Rest as directed by your health care provider.       •Return to your normal activities only as told by your health care provider. Ask your health care provider what activities are safe for you.        Lifestyle       A plate along with examples of foods in a healthy diet.       Silhouette of a person sitting on the floor doing yoga.     • Do not use any products that contain nicotine or tobacco, such as cigarettes, e-cigarettes, and chewing tobacco. If you need help quitting, ask your health care provider.      • Do not drink alcohol.    •Make healthy lifestyle changes as recommended. These may include:  •Getting regular exercise. Ask your health care provider to suggest some exercises that are safe for you.      •Eating a heart-healthy diet. This includes plenty of fresh fruits and vegetables, whole grains, low-fat (lean) protein, and low-fat dairy products. A dietitian can help you find healthy eating options.      •Maintaining a healthy weight.      •Managing any other health conditions you may have, such as high blood pressure (hypertension) or diabetes.      •Reducing stress, such as with yoga or relaxation techniques.        General instructions     •Pay attention to any changes in your symptoms.      •It is up to you to get the results of any tests that were done. Ask your health care provider, or the department that is doing the tests, when your results will be ready.      •Keep all follow-up visits as told by your health care provider. This is important.       •You may be asked to go for further testing if your chest pain does not go away.        Contact a health care provider if:    •Your chest pain does not go away.      •You feel depressed.      •You have a fever.      •You notice changes in your symptoms or develop new symptoms.        Get help right away if:    •Your chest pain gets worse.      •You have a cough that gets worse, or you cough up blood.      •You have severe pain in your abdomen.      •You faint.      •You have sudden, unexplained chest discomfort.      •You have sudden, unexplained discomfort in your arms, back, neck, or jaw.      •You have shortness of breath at any time.      •You suddenly start to sweat, or your skin gets clammy.      •You feel nausea or you vomit.      •You suddenly feel lightheaded or dizzy.      •You have severe weakness, or unexplained weakness or fatigue.      •Your heart begins to beat quickly, or it feels like it is skipping beats.      These symptoms may represent a serious problem that is an emergency. Do not wait to see if the symptoms will go away. Get medical help right away. Call your local emergency services (911 in the [place] Do not drive yourself to the hospital.       Summary    •Chest pain can be caused by a condition that is serious and requires urgent treatment. It may also be caused by something that is not life-threatening.      •Your health care provider may do lab tests and other studies to find the cause of your pain.      •Follow your health care provider's instructions on taking medicines, making lifestyle changes, and getting emergency treatment if symptoms become worse.      •Keep all follow-up visits as told by your health care provider. This includes visits for any further testing if your chest pain does not go away.

## 2023-02-22 NOTE — CONSULT NOTE ADULT - PROBLEM SELECTOR RECOMMENDATION 2
CT A/P 2/13/23- mild intra/extrahepatic biliary dilation s/p cholecystectomy  US abd 2/14/23- CBD not visualized, mild prominence intrahepatic radicles as noted on recent CT  MRCP 2/15/23- dilated CBD 1.1cm possibly related to cholecystectomy, no choledocholithiasis obstructing mass  Hx chronic liver failure, MRCP negative for biliary obstruction     - Trend CBC, LFTs  - Monitor for fever  - Low fat diet

## 2023-02-22 NOTE — CONSULT NOTE ADULT - PROBLEM SELECTOR RECOMMENDATION 3
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    - Will need outpatient follow up for surveillance.

## 2023-02-22 NOTE — PROGRESS NOTE ADULT - NS ATTEND AMEND GEN_ALL_CORE FT
Patient has normal nuclear stress test.  Patient is stable for d/c from Cardiology perspective.    Will sign off

## 2023-02-27 NOTE — CDI QUERY NOTE - NSCDIOTHERTXTBX_GEN_ALL_CORE_HH
Documentation noted ?CHF.    Please clarify the status of CHF  - Acute diastolic CHF ruled in  - CHF ruled out  - Other, please specify    Supporting Documentation:  Consult Note Adult-Cardiology Nurse Practitioner/Attending [Charted Location: [facility] CDU1 09A] [Authored: 13-Feb-2023 16:21]  · Reason for Referral/Consultation:	CP / LE edema  Patient is a 72y old  Male PMH HTN, ?undiagnosed CHF but is on a water pill. Doesn't have an established cardiologist [facility] Presents with one day of acute chest pain located at the right chest wall nipple site, described as squeezing / sharp, lasting one hour and is now almost completely resolved. No prior episodes. Family hx of CHF    Echo:  Summary:   1. Left ventricular ejection fraction, by visual estimation, is 70 to   75%.   2. Normal global left ventricular systolic function.   3. Increased LV wall thickness.   4. Spectral Doppler shows impaired relaxation pattern of left   ventricular myocardial filling (Grade I diastolic dysfunction).   5. Normal right ventricular size and function.   6. There is no evidence of pericardial effusion.   7. Trace mitral valve regurgitation.   8. Sclerotic aortic valve with normal opening.    Serum Pro-Brain Natriuretic Peptide: 221 pg/mL (02.13.23 @ 12:24)

## 2023-06-20 ENCOUNTER — EMERGENCY (EMERGENCY)
Facility: HOSPITAL | Age: 73
LOS: 1 days | Discharge: HOME | End: 2023-06-20
Attending: STUDENT IN AN ORGANIZED HEALTH CARE EDUCATION/TRAINING PROGRAM
Payer: OTHER GOVERNMENT

## 2023-06-20 VITALS
TEMPERATURE: 98 F | WEIGHT: 179.9 LBS | OXYGEN SATURATION: 98 % | SYSTOLIC BLOOD PRESSURE: 152 MMHG | DIASTOLIC BLOOD PRESSURE: 84 MMHG | RESPIRATION RATE: 20 BRPM | HEIGHT: 66 IN | HEART RATE: 81 BPM

## 2023-06-20 VITALS
SYSTOLIC BLOOD PRESSURE: 162 MMHG | RESPIRATION RATE: 20 BRPM | HEART RATE: 78 BPM | TEMPERATURE: 98 F | OXYGEN SATURATION: 100 % | DIASTOLIC BLOOD PRESSURE: 86 MMHG

## 2023-06-20 DIAGNOSIS — Z90.49 ACQUIRED ABSENCE OF OTHER SPECIFIED PARTS OF DIGESTIVE TRACT: Chronic | ICD-10-CM

## 2023-06-20 LAB
ALBUMIN SERPL ELPH-MCNC: 2.6 G/DL — LOW (ref 3.3–5.2)
ALP SERPL-CCNC: 141 U/L — HIGH (ref 40–120)
ALT FLD-CCNC: 25 U/L — SIGNIFICANT CHANGE UP
ANION GAP SERPL CALC-SCNC: 7 MMOL/L — SIGNIFICANT CHANGE UP (ref 5–17)
ANISOCYTOSIS BLD QL: SLIGHT — SIGNIFICANT CHANGE UP
AST SERPL-CCNC: 63 U/L — HIGH
BASOPHILS # BLD AUTO: 0.05 K/UL — SIGNIFICANT CHANGE UP (ref 0–0.2)
BASOPHILS NFR BLD AUTO: 0.9 % — SIGNIFICANT CHANGE UP (ref 0–2)
BILIRUB SERPL-MCNC: 3.4 MG/DL — HIGH (ref 0.4–2)
BUN SERPL-MCNC: 15.6 MG/DL — SIGNIFICANT CHANGE UP (ref 8–20)
CALCIUM SERPL-MCNC: 8.7 MG/DL — SIGNIFICANT CHANGE UP (ref 8.4–10.5)
CHLORIDE SERPL-SCNC: 108 MMOL/L — SIGNIFICANT CHANGE UP (ref 96–108)
CO2 SERPL-SCNC: 26 MMOL/L — SIGNIFICANT CHANGE UP (ref 22–29)
CREAT SERPL-MCNC: 1.36 MG/DL — HIGH (ref 0.5–1.3)
EGFR: 55 ML/MIN/1.73M2 — LOW
EOSINOPHIL # BLD AUTO: 0.2 K/UL — SIGNIFICANT CHANGE UP (ref 0–0.5)
EOSINOPHIL NFR BLD AUTO: 3.5 % — SIGNIFICANT CHANGE UP (ref 0–6)
GIANT PLATELETS BLD QL SMEAR: PRESENT — SIGNIFICANT CHANGE UP
GLUCOSE SERPL-MCNC: 96 MG/DL — SIGNIFICANT CHANGE UP (ref 70–99)
HCT VFR BLD CALC: 33.2 % — LOW (ref 39–50)
HGB BLD-MCNC: 11.4 G/DL — LOW (ref 13–17)
LYMPHOCYTES # BLD AUTO: 0.95 K/UL — LOW (ref 1–3.3)
LYMPHOCYTES # BLD AUTO: 16.7 % — SIGNIFICANT CHANGE UP (ref 13–44)
MACROCYTES BLD QL: SLIGHT — SIGNIFICANT CHANGE UP
MANUAL SMEAR VERIFICATION: SIGNIFICANT CHANGE UP
MCHC RBC-ENTMCNC: 34.3 GM/DL — SIGNIFICANT CHANGE UP (ref 32–36)
MCHC RBC-ENTMCNC: 34.7 PG — HIGH (ref 27–34)
MCV RBC AUTO: 100.9 FL — HIGH (ref 80–100)
MONOCYTES # BLD AUTO: 0.45 K/UL — SIGNIFICANT CHANGE UP (ref 0–0.9)
MONOCYTES NFR BLD AUTO: 7.9 % — SIGNIFICANT CHANGE UP (ref 2–14)
NEUTROPHILS # BLD AUTO: 4.03 K/UL — SIGNIFICANT CHANGE UP (ref 1.8–7.4)
NEUTROPHILS NFR BLD AUTO: 71 % — SIGNIFICANT CHANGE UP (ref 43–77)
PLAT MORPH BLD: NORMAL — SIGNIFICANT CHANGE UP
PLATELET # BLD AUTO: 81 K/UL — LOW (ref 150–400)
POLYCHROMASIA BLD QL SMEAR: SLIGHT — SIGNIFICANT CHANGE UP
POTASSIUM SERPL-MCNC: 4.7 MMOL/L — SIGNIFICANT CHANGE UP (ref 3.5–5.3)
POTASSIUM SERPL-SCNC: 4.7 MMOL/L — SIGNIFICANT CHANGE UP (ref 3.5–5.3)
PROT SERPL-MCNC: 5.8 G/DL — LOW (ref 6.6–8.7)
RBC # BLD: 3.29 M/UL — LOW (ref 4.2–5.8)
RBC # FLD: 15.1 % — HIGH (ref 10.3–14.5)
RBC BLD AUTO: ABNORMAL
SMUDGE CELLS # BLD: PRESENT — SIGNIFICANT CHANGE UP
SODIUM SERPL-SCNC: 141 MMOL/L — SIGNIFICANT CHANGE UP (ref 135–145)
TROPONIN T SERPL-MCNC: <0.01 NG/ML — SIGNIFICANT CHANGE UP (ref 0–0.06)
WBC # BLD: 5.67 K/UL — SIGNIFICANT CHANGE UP (ref 3.8–10.5)
WBC # FLD AUTO: 5.67 K/UL — SIGNIFICANT CHANGE UP (ref 3.8–10.5)

## 2023-06-20 PROCEDURE — 99284 EMERGENCY DEPT VISIT MOD MDM: CPT

## 2023-06-20 PROCEDURE — 93010 ELECTROCARDIOGRAM REPORT: CPT | Mod: 76

## 2023-06-20 PROCEDURE — 71045 X-RAY EXAM CHEST 1 VIEW: CPT | Mod: 26

## 2023-06-20 NOTE — ED ADULT NURSE NOTE - OBJECTIVE STATEMENT
Patient presents to ED c/o right hip pain s/p fainting episode from standing height.  Patient states he was using a walker when he became lightheaded and fainted - denies hitting his head/neck - states he landed on his right side, mostly buttocks area but is c/o right sided hip pain.  Patient able to move RLE without hesitation.  Upon visualization of leg, pitting edema positive b/l - patient states "they always look like that."  Patient a&ox4.  Denies c/p, sob, h/a, n/v/d.  No further complaints at this time.

## 2023-06-20 NOTE — ED PROVIDER NOTE - OBJECTIVE STATEMENT
73 year old male with PMHx HTN,  advanced liver cirrhosis and BPH, chronic LE edema, presents after feeling lightheaded and falling on right hip.  Pat admits to not eating or drinking enough today.  Remembers entire event and denies head injury or LOC.  Normally ambulates with a walker and feels he still can.  Otherwise denies pain/injury elsewhere, bleeding, SOB, chest pain, abdominal pain or fevers.  Denies other pertinent medical problems.  Denies any substance use.

## 2023-06-20 NOTE — ED PROVIDER NOTE - PHYSICAL EXAMINATION
General: NAD, well appearing  HEENT: Normocephalic, atraumatic, PERRLA, EOMI  Neck: No apparent stiffness or JVD  Pulm: Chest wall symmetric and nontender, lungs clear to ascultation   Cardiac: Regular rate and regular rhythm, distal pulses intact  Abdomen: Nontender and distended, baseline per pt  Skin: Skin is warm, dry and intact without rashes or lesions.  Neuro: No motor or sensory deficits    MSK: No deformity or tenderness, able to move hip

## 2023-06-20 NOTE — ED ADULT NURSE NOTE - NSFALLHARMRISKINTERV_ED_ALL_ED
Assistance OOB with selected safe patient handling equipment if applicable/Assistance with ambulation/Communicate risk of Fall with Harm to all staff, patient, and family/Monitor gait and stability/Orthostatic vital signs/Provide patient with walking aids/Provide visual cue: red socks, yellow wristband, yellow gown, etc/Reinforce activity limits and safety measures with patient and family/Bed in lowest position, wheels locked, appropriate side rails in place/Call bell, personal items and telephone in reach/Instruct patient to call for assistance before getting out of bed/chair/stretcher/Non-slip footwear applied when patient is off stretcher/[place] to call system/Physically safe environment - no spills, clutter or unnecessary equipment/Purposeful Proactive Rounding/Room/bathroom lighting operational, light cord in reach

## 2023-06-20 NOTE — ED PROVIDER NOTE - PATIENT PORTAL LINK FT
You can access the FollowMyHealth Patient Portal offered by [facility] by registering at the following website: [email or web address]. By joining [employer]’s FollowMyHealth portal, you will also be able to view your health information using other applications (apps) compatible with our system.

## 2023-06-20 NOTE — ED PROVIDER NOTE - NSFOLLOWUPINSTRUCTIONS_ED_ALL_ED_FT
Hip Pain    The hip is the joint between the upper legs and the lower pelvis. The bones, cartilage, tendons, and muscles of your hip joint support your body and allow you to move around.    Hip pain can range from a minor ache to severe pain in one or both of your hips. The pain may be felt on the inside of the hip joint near the groin, or on the outside near the buttocks and upper thigh. You may also have swelling or stiffness in your hip area.    Follow these instructions at home:      Managing pain, stiffness, and swelling      If directed, put ice on the painful area. To do this:    Put ice in a plastic bag.  Place a towel between your skin and the bag.  Leave the ice on for 20 minutes, 2–3 times a day.  If directed, apply heat to the affected area as often as told by your health care provider. Use the heat source that your health care provider recommends, such as a moist heat pack or a heating pad.    Place a towel between your skin and the heat source.  Leave the heat on for 20–30 minutes.  Remove the heat if your skin turns bright red. This is especially important if you are unable to feel pain, heat, or cold. You may have a greater risk of getting burned.        Activity    Do exercises as told by your health care provider.  Avoid activities that cause pain.        General instructions     Take over-the-counter and prescription medicines only as told by your health care provider.  Keep a journal of your symptoms. Write down:    How often you have hip pain.  The location of your pain.  What the pain feels like.  What makes the pain worse.  Sleep with a pillow between your legs on your most comfortable side.  Keep all follow-up visits as told by your health care provider. This is important.    Contact a health care provider if:  You cannot put weight on your leg.  Your pain or swelling continues or gets worse after one week.  It gets harder to walk.  You have a fever.    Get help right away if:  You fall.  You have a sudden increase in pain and swelling in your hip.  Your hip is red or swollen or very tender to touch.    Summary  Hip pain can range from a minor ache to severe pain in one or both of your hips.  The pain may be felt on the inside of the hip joint near the groin, or on the outside near the buttocks and upper thigh.  Avoid activities that cause pain.  Write down how often you have hip pain, the location of the pain, what makes it worse, and what it feels like.    ADDITIONAL NOTES AND INSTRUCTIONS    Please follow up with your Primary MD in 24-48 hr.  Seek immediate medical care for any new/worsening signs or symptoms.     Near-Syncope       Near-syncope is when you suddenly feel like you might pass out (faint), but you do not actually lose consciousness. This may also be referred to as presyncope. During an episode of near-syncope, you may:    Feel dizzy, weak, or light-headed.  Feel nauseous.  See all white or all black in your field of vision, or see spots.  Have cold, clammy skin.    This condition is caused by a sudden decrease in blood flow to the brain. This decrease can result from various causes, but most of those causes are not dangerous. However, near-syncope may be a sign of a serious medical problem, so it is important to seek medical care.    Follow these instructions at home:      Medicines    Take over-the-counter and prescription medicines only as told by your health care provider.  If you are taking blood pressure or heart medicine, get up slowly and take several minutes to sit and then stand. This can reduce dizziness.        General instructions    Pay attention to any changes in your symptoms.  Talk with your health care provider about your symptoms. You may need to have testing to understand the cause of your near-syncope.  If you start to feel like you might faint, lie down right away and raise (elevate) your feet above the level of your heart. Breathe deeply and steadily. Wait until all of the symptoms have passed.  Have someone stay with you until you feel stable.  Do not drive, use machinery, or play sports until your health care provider says it is okay.  Drink enough fluid to keep your urine pale yellow.  Keep all follow-up visits as told by your health care provider. This is important.    Get help right away if you:  Have a seizure.  Have unusual pain in your chest, abdomen, or back.  Faint once or repeatedly.  Have a severe headache.  Are bleeding from your mouth or rectum, or you have black or tarry stool.  Have a very fast or irregular heartbeat (palpitations).  Are confused.  Have trouble walking.  Have severe weakness.  Have vision problems.    These symptoms may represent a serious problem that is an emergency. Do not wait to see if your symptoms will go away. Get medical help right away. Call your local emergency services (911 in the [place] Do not drive yourself to the hospital.    Summary  Near-syncope is when you suddenly feel like you might pass out (faint), but you do not actually lose consciousness.  This condition is caused by a sudden decrease in blood flow to the brain. This decrease can result from various causes, but most of those causes are not dangerous.  Near-syncope may be a sign of a serious medical problem, so it is important to seek medical care.

## 2023-06-20 NOTE — ED ADULT TRIAGE NOTE - ESI TRIAGE ACUITY LEVEL, MLM
3 Perilesional Excision Additional Text (Leave Blank If You Do Not Want): The margin was drawn around the clinically apparent lesion. Incisions were then made along these lines to the appropriate tissue plane and the lesion was extirpated.

## 2023-06-20 NOTE — ED ADULT TRIAGE NOTE - CHIEF COMPLAINT QUOTE
pt BIBEMS from home after becoming dizzy and falling on his buttock currently complaining of right hip pain denies head strike, loc or blood thinners. both legs look equal in length +3 strengths ekg obtained prior to triage no other complaints at this time

## 2023-06-21 PROBLEM — K74.60 UNSPECIFIED CIRRHOSIS OF LIVER: Chronic | Status: ACTIVE | Noted: 2023-02-21

## 2023-06-21 LAB
APPEARANCE UR: CLEAR — SIGNIFICANT CHANGE UP
BACTERIA # UR AUTO: ABNORMAL
BILIRUB UR-MCNC: NEGATIVE — SIGNIFICANT CHANGE UP
COLOR SPEC: YELLOW — SIGNIFICANT CHANGE UP
DIFF PNL FLD: NEGATIVE — SIGNIFICANT CHANGE UP
EPI CELLS # UR: SIGNIFICANT CHANGE UP
ETHANOL SERPL-MCNC: <10 MG/DL — SIGNIFICANT CHANGE UP (ref 0–9)
GLUCOSE UR QL: NEGATIVE MG/DL — SIGNIFICANT CHANGE UP
KETONES UR-MCNC: ABNORMAL
LEUKOCYTE ESTERASE UR-ACNC: ABNORMAL
NITRITE UR-MCNC: NEGATIVE — SIGNIFICANT CHANGE UP
PH UR: 6.5 — SIGNIFICANT CHANGE UP (ref 5–8)
PROT UR-MCNC: NEGATIVE — SIGNIFICANT CHANGE UP
RBC CASTS # UR COMP ASSIST: SIGNIFICANT CHANGE UP /HPF (ref 0–4)
SP GR SPEC: 1.02 — SIGNIFICANT CHANGE UP (ref 1.01–1.02)
UROBILINOGEN FLD QL: 12 MG/DL
WBC UR QL: SIGNIFICANT CHANGE UP /HPF (ref 0–5)

## 2023-06-21 PROCEDURE — 84484 ASSAY OF TROPONIN QUANT: CPT

## 2023-06-21 PROCEDURE — 80307 DRUG TEST PRSMV CHEM ANLYZR: CPT

## 2023-06-21 PROCEDURE — 71045 X-RAY EXAM CHEST 1 VIEW: CPT

## 2023-06-21 PROCEDURE — 36415 COLL VENOUS BLD VENIPUNCTURE: CPT

## 2023-06-21 PROCEDURE — 85025 COMPLETE CBC W/AUTO DIFF WBC: CPT

## 2023-06-21 PROCEDURE — 72192 CT PELVIS W/O DYE: CPT | Mod: MA

## 2023-06-21 PROCEDURE — 99285 EMERGENCY DEPT VISIT HI MDM: CPT | Mod: 25

## 2023-06-21 PROCEDURE — 80053 COMPREHEN METABOLIC PANEL: CPT

## 2023-06-21 PROCEDURE — 81001 URINALYSIS AUTO W/SCOPE: CPT

## 2023-06-21 PROCEDURE — 72192 CT PELVIS W/O DYE: CPT | Mod: 26,MA

## 2023-06-21 PROCEDURE — 93005 ELECTROCARDIOGRAM TRACING: CPT

## 2023-07-05 NOTE — CONSULT NOTE ADULT - PROBLEM/RECOMMENDATION-2
[de-identified] : The patient is a 66 year old R hand dominant M who presents today complaining of right elbow .   \par Date of Injury/Onset: intermittent for about 15 years \par Pain:    At Rest: 2/10  \par With Activity:  7/10  \par Mechanism of injury: no specific injury, reports might have strained it during pickle ball \par This is not a Work Related Injury being treated under Worker's Compensation. \par This is not an athletic injury occurring associated with an interscholastic or organized sports team. \par Quality of symptoms: swelling, aching \par Improves with: ice, advil \par Worse with: certain exercises \par Treatment/Imaging since last visit: MRI (OCOA)\par Out of work/sport: _, since _ \par School/Sport/Position/Occupation:RETIRED \par Additional Information: hx of lateral epicondylitis \par  DISPLAY PLAN FREE TEXT

## 2023-12-14 NOTE — DISCHARGE NOTE PROVIDER - NSDCCPCAREPLAN_GEN_ALL_CORE_FT
Chief Complaint:   Chief Complaint   Patient presents with    Consultation     New pt. aaa         HPI: Patient came to the office with his wife, for the evaluation of abdominal aortic aneurysm, diagnosed a few years ago, underwent extensive evaluation by his vascular surgical team at the [facility] who recommended initially intervention and because of complicated location which is juxtarenal location as well as distal aortic location as well patient underwent cardiac evaluation and was informed because significant cardiac issues, they decided not to do surgery but follow once a year    When he called, his original vascular surgeon negative clinic left, they made appointment to see a different vascular surgeon who also called the patient, moved to a different location and patient discussed with his PCP who referred the patient to vascular service locally in the interim    Patient denies any abdominal pain or back pain    Patient has not seen his cardiologist at the [facility] since the initial evaluation    Patient drinks beer, anywhere usually 3-4 beer, fairly regularly but sometimes maintained up to 10 beers a day    Patient quit smoking      Patient denies any focal lateralizing neurological symptoms like loss of speech, vision or loss of function of extremity    Patient can walk a few blocks slow, and denies any symptoms of rest pain    Allergies   Allergen Reactions    No Known Allergies        Current Outpatient Medications   Medication Sig Dispense Refill    liothyronine (CYTOMEL) 5 MCG tablet Take 1 tablet by mouth daily      vitamin D (CHOLECALCIFEROL) 25 MCG (1000 UT) TABS tablet Take 1 tablet by mouth daily      Omega 3 1000 MG CAPS Take by mouth      Multiple Vitamins-Minerals (THERAPEUTIC MULTIVITAMIN-MINERALS) tablet Take 1 tablet by mouth daily      atorvastatin (LIPITOR) 40 MG tablet DAILY 90 tablet 3    colchicine (COLCRYS) 0.6 MG tablet TAKE 1 TABLET DAILY AS NEEDED 90 tablet 3
PRINCIPAL DISCHARGE DIAGNOSIS  Diagnosis: Cirrhosis  Assessment and Plan of Treatment: take medications as prescribed  follow up with gastroenterologist within 2 weeks  call dr Bates office and make appointment with Dr Dumont      SECONDARY DISCHARGE DIAGNOSES  Diagnosis: Chest pain  Assessment and Plan of Treatment: acute coronary syndrome ruled out  cardiology follow up for stress test    Diagnosis: BPH (benign prostatic hyperplasia)  Assessment and Plan of Treatment: flomax  follow up with urology

## 2024-04-23 NOTE — ED ADULT NURSE REASSESSMENT NOTE - ED CARDIAC RHYTHM
Called patient to notify the referral has been done and was faxed over to [facility]. KULDIPM  
regular
regular

## 2024-06-08 NOTE — PATIENT PROFILE ADULT - COMPLETE THE FOLLOWING IF THE PATIENT REFUSES THE INFLUENZA VACCINE:
06/08/24 1957   Respiratory Assessment   Resp Comments patient transitioned from NC to NC   Oxygen Therapy/Pulse Ox   O2 Device Nasal cannula   O2 Therapy Oxygen humidified   Nasal Cannula O2 Flow Rate (L/min) 6 L/min   Calculated FIO2 (%) - Nasal Cannula 44   SpO2 Activity At Rest   $ Pulse Oximetry Spot Check Charge Completed        Risks/benefits discussed with patient/surrogate

## 2024-06-18 ENCOUNTER — INPATIENT (INPATIENT)
Facility: HOSPITAL | Age: 74
LOS: 7 days | Discharge: NURSING, REHAB OR LONG-TERM CARE | DRG: 312 | End: 2024-06-26
Attending: HOSPITALIST | Admitting: HOSPITALIST
Payer: OTHER GOVERNMENT

## 2024-06-18 VITALS
WEIGHT: 240.08 LBS | OXYGEN SATURATION: 96 % | TEMPERATURE: 98 F | SYSTOLIC BLOOD PRESSURE: 156 MMHG | DIASTOLIC BLOOD PRESSURE: 86 MMHG | RESPIRATION RATE: 18 BRPM | HEART RATE: 86 BPM

## 2024-06-18 DIAGNOSIS — Z90.49 ACQUIRED ABSENCE OF OTHER SPECIFIED PARTS OF DIGESTIVE TRACT: Chronic | ICD-10-CM

## 2024-06-18 LAB
ALBUMIN SERPL ELPH-MCNC: 3 G/DL — LOW (ref 3.3–5.2)
ALP SERPL-CCNC: 127 U/L — HIGH (ref 40–120)
ALT FLD-CCNC: 31 U/L — SIGNIFICANT CHANGE UP
ANION GAP SERPL CALC-SCNC: 11 MMOL/L — SIGNIFICANT CHANGE UP (ref 5–17)
ANISOCYTOSIS BLD QL: SLIGHT — SIGNIFICANT CHANGE UP
APPEARANCE UR: ABNORMAL
APTT BLD: 42.4 SEC — HIGH (ref 24.5–35.6)
AST SERPL-CCNC: 80 U/L — HIGH
BACTERIA # UR AUTO: NEGATIVE /HPF — SIGNIFICANT CHANGE UP
BASOPHILS # BLD AUTO: 0.03 K/UL — SIGNIFICANT CHANGE UP (ref 0–0.2)
BASOPHILS NFR BLD AUTO: 0.3 % — SIGNIFICANT CHANGE UP (ref 0–2)
BILIRUB SERPL-MCNC: 6.5 MG/DL — HIGH (ref 0.4–2)
BILIRUB UR-MCNC: ABNORMAL
BLD GP AB SCN SERPL QL: SIGNIFICANT CHANGE UP
BUN SERPL-MCNC: 17.1 MG/DL — SIGNIFICANT CHANGE UP (ref 8–20)
CALCIUM SERPL-MCNC: 9 MG/DL — SIGNIFICANT CHANGE UP (ref 8.4–10.5)
CAST: 1 /LPF — SIGNIFICANT CHANGE UP (ref 0–4)
CHLORIDE SERPL-SCNC: 105 MMOL/L — SIGNIFICANT CHANGE UP (ref 96–108)
CO2 SERPL-SCNC: 22 MMOL/L — SIGNIFICANT CHANGE UP (ref 22–29)
COLOR SPEC: ABNORMAL
CREAT SERPL-MCNC: 1.12 MG/DL — SIGNIFICANT CHANGE UP (ref 0.5–1.3)
DIFF PNL FLD: NEGATIVE — SIGNIFICANT CHANGE UP
EGFR: 69 ML/MIN/1.73M2 — SIGNIFICANT CHANGE UP
EOSINOPHIL # BLD AUTO: 0.06 K/UL — SIGNIFICANT CHANGE UP (ref 0–0.5)
EOSINOPHIL NFR BLD AUTO: 0.7 % — SIGNIFICANT CHANGE UP (ref 0–6)
GLUCOSE SERPL-MCNC: 78 MG/DL — SIGNIFICANT CHANGE UP (ref 70–99)
GLUCOSE UR QL: NEGATIVE MG/DL — SIGNIFICANT CHANGE UP
HCT VFR BLD CALC: 38.2 % — LOW (ref 39–50)
HGB BLD-MCNC: 13 G/DL — SIGNIFICANT CHANGE UP (ref 13–17)
IMM GRANULOCYTES NFR BLD AUTO: 0.7 % — SIGNIFICANT CHANGE UP (ref 0–0.9)
INR BLD: 1.89 RATIO — HIGH (ref 0.85–1.18)
KETONES UR-MCNC: NEGATIVE MG/DL — SIGNIFICANT CHANGE UP
LEUKOCYTE ESTERASE UR-ACNC: ABNORMAL
LYMPHOCYTES # BLD AUTO: 0.56 K/UL — LOW (ref 1–3.3)
LYMPHOCYTES # BLD AUTO: 6.3 % — LOW (ref 13–44)
MACROCYTES BLD QL: SLIGHT — SIGNIFICANT CHANGE UP
MANUAL SMEAR VERIFICATION: SIGNIFICANT CHANGE UP
MCHC RBC-ENTMCNC: 34 GM/DL — SIGNIFICANT CHANGE UP (ref 32–36)
MCHC RBC-ENTMCNC: 34.5 PG — HIGH (ref 27–34)
MCV RBC AUTO: 101.3 FL — HIGH (ref 80–100)
MONOCYTES # BLD AUTO: 0.97 K/UL — HIGH (ref 0–0.9)
MONOCYTES NFR BLD AUTO: 10.9 % — SIGNIFICANT CHANGE UP (ref 2–14)
NEUTROPHILS # BLD AUTO: 7.25 K/UL — SIGNIFICANT CHANGE UP (ref 1.8–7.4)
NEUTROPHILS NFR BLD AUTO: 81.1 % — HIGH (ref 43–77)
NITRITE UR-MCNC: POSITIVE
NT-PROBNP SERPL-SCNC: 132 PG/ML — SIGNIFICANT CHANGE UP (ref 0–300)
PH UR: 5.5 — SIGNIFICANT CHANGE UP (ref 5–8)
PLAT MORPH BLD: NORMAL — SIGNIFICANT CHANGE UP
PLATELET # BLD AUTO: 66 K/UL — LOW (ref 150–400)
POTASSIUM SERPL-MCNC: 4.7 MMOL/L — SIGNIFICANT CHANGE UP (ref 3.5–5.3)
POTASSIUM SERPL-SCNC: 4.7 MMOL/L — SIGNIFICANT CHANGE UP (ref 3.5–5.3)
PROT SERPL-MCNC: 6 G/DL — LOW (ref 6.6–8.7)
PROT UR-MCNC: SIGNIFICANT CHANGE UP MG/DL
PROTHROM AB SERPL-ACNC: 20.6 SEC — HIGH (ref 9.5–13)
RBC # BLD: 3.77 M/UL — LOW (ref 4.2–5.8)
RBC # FLD: 15.3 % — HIGH (ref 10.3–14.5)
RBC BLD AUTO: ABNORMAL
RBC CASTS # UR COMP ASSIST: 3 /HPF — SIGNIFICANT CHANGE UP (ref 0–4)
SODIUM SERPL-SCNC: 138 MMOL/L — SIGNIFICANT CHANGE UP (ref 135–145)
SP GR SPEC: 1.02 — SIGNIFICANT CHANGE UP (ref 1–1.03)
TROPONIN T, HIGH SENSITIVITY RESULT: 19 NG/L — SIGNIFICANT CHANGE UP (ref 0–51)
TSH SERPL-MCNC: 1.97 UIU/ML — SIGNIFICANT CHANGE UP (ref 0.27–4.2)
UROBILINOGEN FLD QL: 1 MG/DL — SIGNIFICANT CHANGE UP (ref 0.2–1)
WBC # BLD: 8.93 K/UL — SIGNIFICANT CHANGE UP (ref 3.8–10.5)
WBC # FLD AUTO: 8.93 K/UL — SIGNIFICANT CHANGE UP (ref 3.8–10.5)
WBC UR QL: 3 /HPF — SIGNIFICANT CHANGE UP (ref 0–5)

## 2024-06-18 PROCEDURE — 73552 X-RAY EXAM OF FEMUR 2/>: CPT | Mod: 26,LT

## 2024-06-18 PROCEDURE — 36000 PLACE NEEDLE IN VEIN: CPT

## 2024-06-18 PROCEDURE — 71045 X-RAY EXAM CHEST 1 VIEW: CPT | Mod: 26

## 2024-06-18 PROCEDURE — 72131 CT LUMBAR SPINE W/O DYE: CPT | Mod: 26,MC

## 2024-06-18 PROCEDURE — 73502 X-RAY EXAM HIP UNI 2-3 VIEWS: CPT | Mod: 26,LT

## 2024-06-18 PROCEDURE — 72128 CT CHEST SPINE W/O DYE: CPT | Mod: 26,MC

## 2024-06-18 PROCEDURE — 73562 X-RAY EXAM OF KNEE 3: CPT | Mod: 26,LT

## 2024-06-18 PROCEDURE — 71250 CT THORAX DX C-: CPT | Mod: 26,MC

## 2024-06-18 PROCEDURE — 70450 CT HEAD/BRAIN W/O DYE: CPT | Mod: 26,MC

## 2024-06-18 PROCEDURE — 99285 EMERGENCY DEPT VISIT HI MDM: CPT | Mod: 25

## 2024-06-18 PROCEDURE — 74176 CT ABD & PELVIS W/O CONTRAST: CPT | Mod: 26,MC

## 2024-06-18 PROCEDURE — 72125 CT NECK SPINE W/O DYE: CPT | Mod: 26,MC

## 2024-06-18 PROCEDURE — 93010 ELECTROCARDIOGRAM REPORT: CPT

## 2024-06-18 RX ORDER — AMLODIPINE BESYLATE 2.5 MG/1
5 TABLET ORAL ONCE
Refills: 0 | Status: COMPLETED | OUTPATIENT
Start: 2024-06-18 | End: 2024-06-18

## 2024-06-18 RX ORDER — FENTANYL CITRATE 50 UG/ML
50 INJECTION, SOLUTION INTRAMUSCULAR; INTRAVENOUS ONCE
Refills: 0 | Status: DISCONTINUED | OUTPATIENT
Start: 2024-06-18 | End: 2024-06-18

## 2024-06-18 RX ORDER — MORPHINE SULFATE 100 MG/1
4 TABLET, EXTENDED RELEASE ORAL ONCE
Refills: 0 | Status: DISCONTINUED | OUTPATIENT
Start: 2024-06-18 | End: 2024-06-18

## 2024-06-18 RX ORDER — ACETAMINOPHEN 325 MG
1000 TABLET ORAL ONCE
Refills: 0 | Status: DISCONTINUED | OUTPATIENT
Start: 2024-06-18 | End: 2024-06-18

## 2024-06-18 RX ADMIN — MORPHINE SULFATE 4 MILLIGRAM(S): 100 TABLET, EXTENDED RELEASE ORAL at 21:11

## 2024-06-18 RX ADMIN — FENTANYL CITRATE 50 MICROGRAM(S): 50 INJECTION, SOLUTION INTRAMUSCULAR; INTRAVENOUS at 18:22

## 2024-06-18 RX ADMIN — MORPHINE SULFATE 4 MILLIGRAM(S): 100 TABLET, EXTENDED RELEASE ORAL at 21:41

## 2024-06-18 RX ADMIN — AMLODIPINE BESYLATE 5 MILLIGRAM(S): 2.5 TABLET ORAL at 23:37

## 2024-06-19 ENCOUNTER — RESULT REVIEW (OUTPATIENT)
Age: 74
End: 2024-06-19

## 2024-06-19 DIAGNOSIS — I10 ESSENTIAL (PRIMARY) HYPERTENSION: ICD-10-CM

## 2024-06-19 DIAGNOSIS — R42 DIZZINESS AND GIDDINESS: ICD-10-CM

## 2024-06-19 DIAGNOSIS — R55 SYNCOPE AND COLLAPSE: ICD-10-CM

## 2024-06-19 LAB — D DIMER BLD IA.RAPID-MCNC: 2532 NG/ML DDU — HIGH

## 2024-06-19 PROCEDURE — 93306 TTE W/DOPPLER COMPLETE: CPT | Mod: 26

## 2024-06-19 PROCEDURE — 99223 1ST HOSP IP/OBS HIGH 75: CPT

## 2024-06-19 PROCEDURE — 93970 EXTREMITY STUDY: CPT | Mod: 26

## 2024-06-19 RX ORDER — ONDANSETRON HYDROCHLORIDE 2 MG/ML
4 INJECTION INTRAMUSCULAR; INTRAVENOUS EVERY 8 HOURS
Refills: 0 | Status: DISCONTINUED | OUTPATIENT
Start: 2024-06-19 | End: 2024-06-26

## 2024-06-19 RX ORDER — METHOCARBAMOL 500 MG
500 TABLET ORAL THREE TIMES A DAY
Refills: 0 | Status: DISCONTINUED | OUTPATIENT
Start: 2024-06-19 | End: 2024-06-24

## 2024-06-19 RX ORDER — FUROSEMIDE 10 MG/ML
40 INJECTION, SOLUTION INTRAMUSCULAR; INTRAVENOUS DAILY
Refills: 0 | Status: DISCONTINUED | OUTPATIENT
Start: 2024-06-19 | End: 2024-06-19

## 2024-06-19 RX ORDER — LIDOCAINE HCL 28 MG/G
1 GEL TOPICAL DAILY
Refills: 0 | Status: DISCONTINUED | OUTPATIENT
Start: 2024-06-19 | End: 2024-06-26

## 2024-06-19 RX ORDER — ALBUMIN HUMAN 5 %
50 INTRAVENOUS SOLUTION INTRAVENOUS EVERY 6 HOURS
Refills: 0 | Status: COMPLETED | OUTPATIENT
Start: 2024-06-19 | End: 2024-06-19

## 2024-06-19 RX ORDER — POTASSIUM CHLORIDE 600 MG/1
20 TABLET, FILM COATED, EXTENDED RELEASE ORAL DAILY
Refills: 0 | Status: DISCONTINUED | OUTPATIENT
Start: 2024-06-19 | End: 2024-06-20

## 2024-06-19 RX ORDER — SPIRONOLACTONE 25 MG/1
25 TABLET ORAL DAILY
Refills: 0 | Status: DISCONTINUED | OUTPATIENT
Start: 2024-06-19 | End: 2024-06-19

## 2024-06-19 RX ORDER — CARVEDILOL PHOSPHATE 80 MG/1
3.12 CAPSULE, EXTENDED RELEASE ORAL EVERY 12 HOURS
Refills: 0 | Status: DISCONTINUED | OUTPATIENT
Start: 2024-06-19 | End: 2024-06-26

## 2024-06-19 RX ORDER — SPIRONOLACTONE 25 MG/1
50 TABLET ORAL DAILY
Refills: 0 | Status: DISCONTINUED | OUTPATIENT
Start: 2024-06-19 | End: 2024-06-20

## 2024-06-19 RX ORDER — TAMSULOSIN HYDROCHLORIDE 0.4 MG/1
0.4 CAPSULE ORAL AT BEDTIME
Refills: 0 | Status: DISCONTINUED | OUTPATIENT
Start: 2024-06-19 | End: 2024-06-26

## 2024-06-19 RX ORDER — HEPARIN SODIUM 50 [USP'U]/ML
5000 INJECTION, SOLUTION INTRAVENOUS EVERY 12 HOURS
Refills: 0 | Status: DISCONTINUED | OUTPATIENT
Start: 2024-06-19 | End: 2024-06-19

## 2024-06-19 RX ORDER — LOSARTAN POTASSIUM 100 MG/1
50 TABLET, FILM COATED ORAL
Refills: 0 | Status: DISCONTINUED | OUTPATIENT
Start: 2024-06-19 | End: 2024-06-20

## 2024-06-19 RX ORDER — MAGNESIUM, ALUMINUM HYDROXIDE 400-400
30 TABLET,CHEWABLE ORAL EVERY 4 HOURS
Refills: 0 | Status: DISCONTINUED | OUTPATIENT
Start: 2024-06-19 | End: 2024-06-26

## 2024-06-19 RX ORDER — FUROSEMIDE 10 MG/ML
20 INJECTION, SOLUTION INTRAMUSCULAR; INTRAVENOUS EVERY 12 HOURS
Refills: 0 | Status: DISCONTINUED | OUTPATIENT
Start: 2024-06-19 | End: 2024-06-20

## 2024-06-19 RX ORDER — ACETAMINOPHEN 325 MG
650 TABLET ORAL EVERY 6 HOURS
Refills: 0 | Status: DISCONTINUED | OUTPATIENT
Start: 2024-06-19 | End: 2024-06-26

## 2024-06-19 RX ORDER — HEPARIN SODIUM 50 [USP'U]/ML
5000 INJECTION, SOLUTION INTRAVENOUS EVERY 8 HOURS
Refills: 0 | Status: DISCONTINUED | OUTPATIENT
Start: 2024-06-19 | End: 2024-06-20

## 2024-06-19 RX ADMIN — POTASSIUM CHLORIDE 20 MILLIEQUIVALENT(S): 600 TABLET, FILM COATED, EXTENDED RELEASE ORAL at 14:03

## 2024-06-19 RX ADMIN — Medication 50 MILLILITER(S): at 04:14

## 2024-06-19 RX ADMIN — LOSARTAN POTASSIUM 50 MILLIGRAM(S): 100 TABLET, FILM COATED ORAL at 18:01

## 2024-06-19 RX ADMIN — FUROSEMIDE 20 MILLIGRAM(S): 10 INJECTION, SOLUTION INTRAMUSCULAR; INTRAVENOUS at 05:03

## 2024-06-19 RX ADMIN — HEPARIN SODIUM 5000 UNIT(S): 50 INJECTION, SOLUTION INTRAVENOUS at 21:50

## 2024-06-19 RX ADMIN — Medication 650 MILLIGRAM(S): at 08:59

## 2024-06-19 RX ADMIN — FUROSEMIDE 20 MILLIGRAM(S): 10 INJECTION, SOLUTION INTRAMUSCULAR; INTRAVENOUS at 18:01

## 2024-06-19 RX ADMIN — Medication 50 MILLILITER(S): at 10:38

## 2024-06-19 RX ADMIN — Medication 500 MILLIGRAM(S): at 21:50

## 2024-06-19 RX ADMIN — Medication 500 MILLIGRAM(S): at 14:02

## 2024-06-19 RX ADMIN — LOSARTAN POTASSIUM 50 MILLIGRAM(S): 100 TABLET, FILM COATED ORAL at 05:04

## 2024-06-19 RX ADMIN — ONDANSETRON HYDROCHLORIDE 4 MILLIGRAM(S): 2 INJECTION INTRAMUSCULAR; INTRAVENOUS at 05:04

## 2024-06-19 RX ADMIN — HEPARIN SODIUM 5000 UNIT(S): 50 INJECTION, SOLUTION INTRAVENOUS at 05:03

## 2024-06-19 RX ADMIN — Medication 50 MILLILITER(S): at 17:56

## 2024-06-19 RX ADMIN — SPIRONOLACTONE 50 MILLIGRAM(S): 25 TABLET ORAL at 05:04

## 2024-06-19 RX ADMIN — Medication 50 MILLILITER(S): at 14:07

## 2024-06-19 RX ADMIN — HEPARIN SODIUM 5000 UNIT(S): 50 INJECTION, SOLUTION INTRAVENOUS at 14:03

## 2024-06-19 RX ADMIN — CARVEDILOL PHOSPHATE 3.12 MILLIGRAM(S): 80 CAPSULE, EXTENDED RELEASE ORAL at 18:01

## 2024-06-19 RX ADMIN — TAMSULOSIN HYDROCHLORIDE 0.4 MILLIGRAM(S): 0.4 CAPSULE ORAL at 21:50

## 2024-06-19 RX ADMIN — CARVEDILOL PHOSPHATE 3.12 MILLIGRAM(S): 80 CAPSULE, EXTENDED RELEASE ORAL at 05:03

## 2024-06-19 RX ADMIN — Medication 650 MILLIGRAM(S): at 10:29

## 2024-06-20 LAB
ALBUMIN SERPL ELPH-MCNC: 2.9 G/DL — LOW (ref 3.3–5.2)
ALP SERPL-CCNC: 108 U/L — SIGNIFICANT CHANGE UP (ref 40–120)
ALT FLD-CCNC: 27 U/L — SIGNIFICANT CHANGE UP
ANION GAP SERPL CALC-SCNC: 6 MMOL/L — SIGNIFICANT CHANGE UP (ref 5–17)
AST SERPL-CCNC: 81 U/L — HIGH
BILIRUB SERPL-MCNC: 5.8 MG/DL — HIGH (ref 0.4–2)
BUN SERPL-MCNC: 24.4 MG/DL — HIGH (ref 8–20)
CALCIUM SERPL-MCNC: 8.8 MG/DL — SIGNIFICANT CHANGE UP (ref 8.4–10.5)
CHLORIDE SERPL-SCNC: 103 MMOL/L — SIGNIFICANT CHANGE UP (ref 96–108)
CO2 SERPL-SCNC: 29 MMOL/L — SIGNIFICANT CHANGE UP (ref 22–29)
CREAT SERPL-MCNC: 1.31 MG/DL — HIGH (ref 0.5–1.3)
EGFR: 57 ML/MIN/1.73M2 — LOW
GLUCOSE SERPL-MCNC: 124 MG/DL — HIGH (ref 70–99)
HCT VFR BLD CALC: 32.5 % — LOW (ref 39–50)
HGB BLD-MCNC: 11.1 G/DL — LOW (ref 13–17)
INR BLD: 2.49 RATIO — HIGH (ref 0.85–1.18)
MAGNESIUM SERPL-MCNC: 1.8 MG/DL — SIGNIFICANT CHANGE UP (ref 1.6–2.6)
MCHC RBC-ENTMCNC: 34.2 GM/DL — SIGNIFICANT CHANGE UP (ref 32–36)
MCHC RBC-ENTMCNC: 34.8 PG — HIGH (ref 27–34)
MCV RBC AUTO: 101.9 FL — HIGH (ref 80–100)
PLATELET # BLD AUTO: 57 K/UL — LOW (ref 150–400)
POTASSIUM SERPL-MCNC: 4.5 MMOL/L — SIGNIFICANT CHANGE UP (ref 3.5–5.3)
POTASSIUM SERPL-SCNC: 4.5 MMOL/L — SIGNIFICANT CHANGE UP (ref 3.5–5.3)
PROT SERPL-MCNC: 5.1 G/DL — LOW (ref 6.6–8.7)
PROTHROM AB SERPL-ACNC: 26.9 SEC — HIGH (ref 9.5–13)
RBC # BLD: 3.19 M/UL — LOW (ref 4.2–5.8)
RBC # FLD: 15 % — HIGH (ref 10.3–14.5)
SODIUM SERPL-SCNC: 138 MMOL/L — SIGNIFICANT CHANGE UP (ref 135–145)
WBC # BLD: 6.74 K/UL — SIGNIFICANT CHANGE UP (ref 3.8–10.5)
WBC # FLD AUTO: 6.74 K/UL — SIGNIFICANT CHANGE UP (ref 3.8–10.5)

## 2024-06-20 PROCEDURE — 99232 SBSQ HOSP IP/OBS MODERATE 35: CPT

## 2024-06-20 RX ORDER — OXYCODONE HYDROCHLORIDE 100 MG/5ML
2.5 SOLUTION ORAL EVERY 6 HOURS
Refills: 0 | Status: DISCONTINUED | OUTPATIENT
Start: 2024-06-20 | End: 2024-06-24

## 2024-06-20 RX ORDER — LIDOCAINE 4 G/100G
1 CREAM TOPICAL
Qty: 0 | Refills: 0 | DISCHARGE

## 2024-06-20 RX ORDER — ACETAMINOPHEN 325 MG
975 TABLET ORAL EVERY 8 HOURS
Refills: 0 | Status: DISCONTINUED | OUTPATIENT
Start: 2024-06-20 | End: 2024-06-21

## 2024-06-20 RX ORDER — FUROSEMIDE 40 MG
2 TABLET ORAL
Qty: 0 | Refills: 0 | DISCHARGE

## 2024-06-20 RX ORDER — SODIUM CHLORIDE 0.9 % (FLUSH) 0.9 %
1000 SYRINGE (ML) INJECTION
Refills: 0 | Status: COMPLETED | OUTPATIENT
Start: 2024-06-20 | End: 2024-06-20

## 2024-06-20 RX ORDER — LOSARTAN POTASSIUM 100 MG/1
1 TABLET, FILM COATED ORAL
Qty: 0 | Refills: 0 | DISCHARGE

## 2024-06-20 RX ADMIN — LIDOCAINE HCL 1 PATCH: 28 GEL TOPICAL at 19:00

## 2024-06-20 RX ADMIN — Medication 975 MILLIGRAM(S): at 21:21

## 2024-06-20 RX ADMIN — LIDOCAINE HCL 1 PATCH: 28 GEL TOPICAL at 23:59

## 2024-06-20 RX ADMIN — SPIRONOLACTONE 50 MILLIGRAM(S): 25 TABLET ORAL at 05:32

## 2024-06-20 RX ADMIN — HEPARIN SODIUM 5000 UNIT(S): 50 INJECTION, SOLUTION INTRAVENOUS at 05:29

## 2024-06-20 RX ADMIN — LIDOCAINE HCL 1 PATCH: 28 GEL TOPICAL at 11:40

## 2024-06-20 RX ADMIN — Medication 500 MILLIGRAM(S): at 05:33

## 2024-06-20 RX ADMIN — TAMSULOSIN HYDROCHLORIDE 0.4 MILLIGRAM(S): 0.4 CAPSULE ORAL at 21:21

## 2024-06-20 RX ADMIN — POTASSIUM CHLORIDE 20 MILLIEQUIVALENT(S): 600 TABLET, FILM COATED, EXTENDED RELEASE ORAL at 11:41

## 2024-06-20 RX ADMIN — CARVEDILOL PHOSPHATE 3.12 MILLIGRAM(S): 80 CAPSULE, EXTENDED RELEASE ORAL at 17:49

## 2024-06-20 RX ADMIN — Medication 3 MILLIGRAM(S): at 21:21

## 2024-06-20 RX ADMIN — Medication 500 MILLIGRAM(S): at 21:21

## 2024-06-20 RX ADMIN — CARVEDILOL PHOSPHATE 3.12 MILLIGRAM(S): 80 CAPSULE, EXTENDED RELEASE ORAL at 05:30

## 2024-06-20 RX ADMIN — FUROSEMIDE 20 MILLIGRAM(S): 10 INJECTION, SOLUTION INTRAMUSCULAR; INTRAVENOUS at 05:31

## 2024-06-20 RX ADMIN — LOSARTAN POTASSIUM 50 MILLIGRAM(S): 100 TABLET, FILM COATED ORAL at 05:30

## 2024-06-20 RX ADMIN — Medication 500 MILLIGRAM(S): at 13:43

## 2024-06-20 RX ADMIN — Medication 75 MILLILITER(S): at 11:48

## 2024-06-21 LAB
ALBUMIN SERPL ELPH-MCNC: 2.8 G/DL — LOW (ref 3.3–5.2)
ALP SERPL-CCNC: 102 U/L — SIGNIFICANT CHANGE UP (ref 40–120)
ALT FLD-CCNC: 26 U/L — SIGNIFICANT CHANGE UP
ANION GAP SERPL CALC-SCNC: 8 MMOL/L — SIGNIFICANT CHANGE UP (ref 5–17)
AST SERPL-CCNC: 77 U/L — HIGH
BILIRUB SERPL-MCNC: 4.2 MG/DL — HIGH (ref 0.4–2)
BUN SERPL-MCNC: 27.4 MG/DL — HIGH (ref 8–20)
CALCIUM SERPL-MCNC: 8.8 MG/DL — SIGNIFICANT CHANGE UP (ref 8.4–10.5)
CHLORIDE SERPL-SCNC: 104 MMOL/L — SIGNIFICANT CHANGE UP (ref 96–108)
CO2 SERPL-SCNC: 26 MMOL/L — SIGNIFICANT CHANGE UP (ref 22–29)
CREAT SERPL-MCNC: 1.22 MG/DL — SIGNIFICANT CHANGE UP (ref 0.5–1.3)
CULTURE RESULTS: SIGNIFICANT CHANGE UP
EGFR: 62 ML/MIN/1.73M2 — SIGNIFICANT CHANGE UP
GLUCOSE SERPL-MCNC: 91 MG/DL — SIGNIFICANT CHANGE UP (ref 70–99)
HCT VFR BLD CALC: 31.7 % — LOW (ref 39–50)
HGB BLD-MCNC: 11.2 G/DL — LOW (ref 13–17)
MCHC RBC-ENTMCNC: 35.1 PG — HIGH (ref 27–34)
MCHC RBC-ENTMCNC: 35.3 GM/DL — SIGNIFICANT CHANGE UP (ref 32–36)
MCV RBC AUTO: 99.4 FL — SIGNIFICANT CHANGE UP (ref 80–100)
PLATELET # BLD AUTO: 52 K/UL — LOW (ref 150–400)
POTASSIUM SERPL-MCNC: 4.3 MMOL/L — SIGNIFICANT CHANGE UP (ref 3.5–5.3)
POTASSIUM SERPL-SCNC: 4.3 MMOL/L — SIGNIFICANT CHANGE UP (ref 3.5–5.3)
PROT SERPL-MCNC: 5 G/DL — LOW (ref 6.6–8.7)
RBC # BLD: 3.19 M/UL — LOW (ref 4.2–5.8)
RBC # FLD: 14.9 % — HIGH (ref 10.3–14.5)
SODIUM SERPL-SCNC: 138 MMOL/L — SIGNIFICANT CHANGE UP (ref 135–145)
SPECIMEN SOURCE: SIGNIFICANT CHANGE UP
WBC # BLD: 4.61 K/UL — SIGNIFICANT CHANGE UP (ref 3.8–10.5)
WBC # FLD AUTO: 4.61 K/UL — SIGNIFICANT CHANGE UP (ref 3.8–10.5)

## 2024-06-21 PROCEDURE — 99232 SBSQ HOSP IP/OBS MODERATE 35: CPT

## 2024-06-21 RX ADMIN — Medication 500 MILLIGRAM(S): at 05:12

## 2024-06-21 RX ADMIN — Medication 650 MILLIGRAM(S): at 22:38

## 2024-06-21 RX ADMIN — Medication 975 MILLIGRAM(S): at 13:03

## 2024-06-21 RX ADMIN — Medication 500 MILLIGRAM(S): at 22:37

## 2024-06-21 RX ADMIN — Medication 500 MILLIGRAM(S): at 13:03

## 2024-06-21 RX ADMIN — TAMSULOSIN HYDROCHLORIDE 0.4 MILLIGRAM(S): 0.4 CAPSULE ORAL at 22:37

## 2024-06-21 RX ADMIN — Medication 975 MILLIGRAM(S): at 14:03

## 2024-06-21 RX ADMIN — Medication 650 MILLIGRAM(S): at 23:40

## 2024-06-21 RX ADMIN — Medication 975 MILLIGRAM(S): at 05:13

## 2024-06-21 RX ADMIN — CARVEDILOL PHOSPHATE 3.12 MILLIGRAM(S): 80 CAPSULE, EXTENDED RELEASE ORAL at 05:12

## 2024-06-21 RX ADMIN — CARVEDILOL PHOSPHATE 3.12 MILLIGRAM(S): 80 CAPSULE, EXTENDED RELEASE ORAL at 17:05

## 2024-06-21 RX ADMIN — LIDOCAINE HCL 1 PATCH: 28 GEL TOPICAL at 18:26

## 2024-06-21 RX ADMIN — LIDOCAINE HCL 1 PATCH: 28 GEL TOPICAL at 08:09

## 2024-06-22 PROCEDURE — 99232 SBSQ HOSP IP/OBS MODERATE 35: CPT

## 2024-06-22 RX ADMIN — TAMSULOSIN HYDROCHLORIDE 0.4 MILLIGRAM(S): 0.4 CAPSULE ORAL at 21:06

## 2024-06-22 RX ADMIN — Medication 500 MILLIGRAM(S): at 05:58

## 2024-06-22 RX ADMIN — CARVEDILOL PHOSPHATE 3.12 MILLIGRAM(S): 80 CAPSULE, EXTENDED RELEASE ORAL at 05:58

## 2024-06-22 RX ADMIN — Medication 650 MILLIGRAM(S): at 05:58

## 2024-06-22 RX ADMIN — Medication 650 MILLIGRAM(S): at 22:06

## 2024-06-22 RX ADMIN — Medication 500 MILLIGRAM(S): at 13:19

## 2024-06-22 RX ADMIN — Medication 650 MILLIGRAM(S): at 21:06

## 2024-06-22 RX ADMIN — CARVEDILOL PHOSPHATE 3.12 MILLIGRAM(S): 80 CAPSULE, EXTENDED RELEASE ORAL at 17:09

## 2024-06-22 RX ADMIN — LIDOCAINE HCL 1 PATCH: 28 GEL TOPICAL at 13:19

## 2024-06-22 RX ADMIN — Medication 500 MILLIGRAM(S): at 21:06

## 2024-06-22 RX ADMIN — Medication 650 MILLIGRAM(S): at 07:00

## 2024-06-22 RX ADMIN — LIDOCAINE HCL 1 PATCH: 28 GEL TOPICAL at 19:00

## 2024-06-23 PROCEDURE — 99232 SBSQ HOSP IP/OBS MODERATE 35: CPT

## 2024-06-23 RX ORDER — SENNOSIDES 8.6 MG
2 TABLET ORAL AT BEDTIME
Refills: 0 | Status: DISCONTINUED | OUTPATIENT
Start: 2024-06-23 | End: 2024-06-26

## 2024-06-23 RX ORDER — POLYETHYLENE GLYCOL 3350 1 G/G
17 POWDER ORAL DAILY
Refills: 0 | Status: DISCONTINUED | OUTPATIENT
Start: 2024-06-23 | End: 2024-06-26

## 2024-06-23 RX ADMIN — Medication 500 MILLIGRAM(S): at 05:30

## 2024-06-23 RX ADMIN — Medication 500 MILLIGRAM(S): at 14:56

## 2024-06-23 RX ADMIN — CARVEDILOL PHOSPHATE 3.12 MILLIGRAM(S): 80 CAPSULE, EXTENDED RELEASE ORAL at 05:30

## 2024-06-23 RX ADMIN — Medication 500 MILLIGRAM(S): at 21:48

## 2024-06-23 RX ADMIN — Medication 650 MILLIGRAM(S): at 22:48

## 2024-06-23 RX ADMIN — Medication 2 TABLET(S): at 21:48

## 2024-06-23 RX ADMIN — LIDOCAINE HCL 1 PATCH: 28 GEL TOPICAL at 14:56

## 2024-06-23 RX ADMIN — LIDOCAINE HCL 1 PATCH: 28 GEL TOPICAL at 01:00

## 2024-06-23 RX ADMIN — LIDOCAINE HCL 1 PATCH: 28 GEL TOPICAL at 19:00

## 2024-06-23 RX ADMIN — Medication 650 MILLIGRAM(S): at 14:55

## 2024-06-23 RX ADMIN — TAMSULOSIN HYDROCHLORIDE 0.4 MILLIGRAM(S): 0.4 CAPSULE ORAL at 21:48

## 2024-06-23 RX ADMIN — CARVEDILOL PHOSPHATE 3.12 MILLIGRAM(S): 80 CAPSULE, EXTENDED RELEASE ORAL at 18:04

## 2024-06-23 RX ADMIN — Medication 650 MILLIGRAM(S): at 21:48

## 2024-06-24 PROCEDURE — 99232 SBSQ HOSP IP/OBS MODERATE 35: CPT

## 2024-06-24 RX ORDER — METHOCARBAMOL 500 MG
750 TABLET ORAL THREE TIMES A DAY
Refills: 0 | Status: DISCONTINUED | OUTPATIENT
Start: 2024-06-24 | End: 2024-06-26

## 2024-06-24 RX ADMIN — Medication 500 MILLIGRAM(S): at 12:31

## 2024-06-24 RX ADMIN — Medication 500 MILLIGRAM(S): at 06:26

## 2024-06-24 RX ADMIN — CARVEDILOL PHOSPHATE 3.12 MILLIGRAM(S): 80 CAPSULE, EXTENDED RELEASE ORAL at 17:33

## 2024-06-24 RX ADMIN — Medication 2 TABLET(S): at 22:20

## 2024-06-24 RX ADMIN — TAMSULOSIN HYDROCHLORIDE 0.4 MILLIGRAM(S): 0.4 CAPSULE ORAL at 22:19

## 2024-06-24 RX ADMIN — Medication 650 MILLIGRAM(S): at 23:19

## 2024-06-24 RX ADMIN — CARVEDILOL PHOSPHATE 3.12 MILLIGRAM(S): 80 CAPSULE, EXTENDED RELEASE ORAL at 06:26

## 2024-06-24 RX ADMIN — LIDOCAINE HCL 1 PATCH: 28 GEL TOPICAL at 03:00

## 2024-06-24 RX ADMIN — LIDOCAINE HCL 1 PATCH: 28 GEL TOPICAL at 12:31

## 2024-06-24 RX ADMIN — Medication 650 MILLIGRAM(S): at 22:19

## 2024-06-24 RX ADMIN — ONDANSETRON HYDROCHLORIDE 4 MILLIGRAM(S): 2 INJECTION INTRAMUSCULAR; INTRAVENOUS at 12:30

## 2024-06-25 ENCOUNTER — TRANSCRIPTION ENCOUNTER (OUTPATIENT)
Age: 74
End: 2024-06-25

## 2024-06-25 LAB
ALBUMIN SERPL ELPH-MCNC: 2.6 G/DL — LOW (ref 3.3–5.2)
ALP SERPL-CCNC: 113 U/L — SIGNIFICANT CHANGE UP (ref 40–120)
ALT FLD-CCNC: 27 U/L — SIGNIFICANT CHANGE UP
ANION GAP SERPL CALC-SCNC: 7 MMOL/L — SIGNIFICANT CHANGE UP (ref 5–17)
AST SERPL-CCNC: 67 U/L — HIGH
BILIRUB SERPL-MCNC: 3.9 MG/DL — HIGH (ref 0.4–2)
BUN SERPL-MCNC: 25.6 MG/DL — HIGH (ref 8–20)
CALCIUM SERPL-MCNC: 8.8 MG/DL — SIGNIFICANT CHANGE UP (ref 8.4–10.5)
CHLORIDE SERPL-SCNC: 104 MMOL/L — SIGNIFICANT CHANGE UP (ref 96–108)
CO2 SERPL-SCNC: 24 MMOL/L — SIGNIFICANT CHANGE UP (ref 22–29)
CREAT SERPL-MCNC: 1.21 MG/DL — SIGNIFICANT CHANGE UP (ref 0.5–1.3)
EGFR: 63 ML/MIN/1.73M2 — SIGNIFICANT CHANGE UP
GLUCOSE SERPL-MCNC: 86 MG/DL — SIGNIFICANT CHANGE UP (ref 70–99)
HCT VFR BLD CALC: 35.1 % — LOW (ref 39–50)
HGB BLD-MCNC: 12 G/DL — LOW (ref 13–17)
MAGNESIUM SERPL-MCNC: 1.8 MG/DL — SIGNIFICANT CHANGE UP (ref 1.6–2.6)
MCHC RBC-ENTMCNC: 34.2 GM/DL — SIGNIFICANT CHANGE UP (ref 32–36)
MCHC RBC-ENTMCNC: 34.9 PG — HIGH (ref 27–34)
MCV RBC AUTO: 102 FL — HIGH (ref 80–100)
PLATELET # BLD AUTO: 63 K/UL — LOW (ref 150–400)
POTASSIUM SERPL-MCNC: 4.9 MMOL/L — SIGNIFICANT CHANGE UP (ref 3.5–5.3)
POTASSIUM SERPL-SCNC: 4.9 MMOL/L — SIGNIFICANT CHANGE UP (ref 3.5–5.3)
PROT SERPL-MCNC: 5.1 G/DL — LOW (ref 6.6–8.7)
RBC # BLD: 3.44 M/UL — LOW (ref 4.2–5.8)
RBC # FLD: 15.2 % — HIGH (ref 10.3–14.5)
SODIUM SERPL-SCNC: 135 MMOL/L — SIGNIFICANT CHANGE UP (ref 135–145)
WBC # BLD: 4.74 K/UL — SIGNIFICANT CHANGE UP (ref 3.8–10.5)
WBC # FLD AUTO: 4.74 K/UL — SIGNIFICANT CHANGE UP (ref 3.8–10.5)

## 2024-06-25 PROCEDURE — 99232 SBSQ HOSP IP/OBS MODERATE 35: CPT

## 2024-06-25 RX ORDER — MAGNESIUM SULFATE 100 %
1 POWDER (GRAM) MISCELLANEOUS ONCE
Refills: 0 | Status: COMPLETED | OUTPATIENT
Start: 2024-06-25 | End: 2024-06-25

## 2024-06-25 RX ADMIN — LIDOCAINE HCL 1 PATCH: 28 GEL TOPICAL at 20:00

## 2024-06-25 RX ADMIN — LIDOCAINE HCL 1 PATCH: 28 GEL TOPICAL at 08:02

## 2024-06-25 RX ADMIN — TAMSULOSIN HYDROCHLORIDE 0.4 MILLIGRAM(S): 0.4 CAPSULE ORAL at 22:31

## 2024-06-25 RX ADMIN — Medication 100 GRAM(S): at 09:10

## 2024-06-25 RX ADMIN — CARVEDILOL PHOSPHATE 3.12 MILLIGRAM(S): 80 CAPSULE, EXTENDED RELEASE ORAL at 05:15

## 2024-06-25 RX ADMIN — Medication 2 TABLET(S): at 22:31

## 2024-06-25 RX ADMIN — Medication 650 MILLIGRAM(S): at 08:03

## 2024-06-25 RX ADMIN — CARVEDILOL PHOSPHATE 3.12 MILLIGRAM(S): 80 CAPSULE, EXTENDED RELEASE ORAL at 16:33

## 2024-06-25 RX ADMIN — LIDOCAINE HCL 1 PATCH: 28 GEL TOPICAL at 19:00

## 2024-06-25 RX ADMIN — LIDOCAINE HCL 1 PATCH: 28 GEL TOPICAL at 00:57

## 2024-06-26 ENCOUNTER — TRANSCRIPTION ENCOUNTER (OUTPATIENT)
Age: 74
End: 2024-06-26

## 2024-06-26 VITALS
HEART RATE: 65 BPM | SYSTOLIC BLOOD PRESSURE: 129 MMHG | TEMPERATURE: 98 F | RESPIRATION RATE: 17 BRPM | OXYGEN SATURATION: 98 % | DIASTOLIC BLOOD PRESSURE: 77 MMHG

## 2024-06-26 PROCEDURE — 86901 BLOOD TYPING SEROLOGIC RH(D): CPT

## 2024-06-26 PROCEDURE — 93005 ELECTROCARDIOGRAM TRACING: CPT

## 2024-06-26 PROCEDURE — 85730 THROMBOPLASTIN TIME PARTIAL: CPT

## 2024-06-26 PROCEDURE — 71045 X-RAY EXAM CHEST 1 VIEW: CPT

## 2024-06-26 PROCEDURE — 84484 ASSAY OF TROPONIN QUANT: CPT

## 2024-06-26 PROCEDURE — 96374 THER/PROPH/DIAG INJ IV PUSH: CPT

## 2024-06-26 PROCEDURE — 36415 COLL VENOUS BLD VENIPUNCTURE: CPT

## 2024-06-26 PROCEDURE — 99239 HOSP IP/OBS DSCHRG MGMT >30: CPT

## 2024-06-26 PROCEDURE — 73502 X-RAY EXAM HIP UNI 2-3 VIEWS: CPT

## 2024-06-26 PROCEDURE — P9047: CPT

## 2024-06-26 PROCEDURE — 71250 CT THORAX DX C-: CPT | Mod: MC

## 2024-06-26 PROCEDURE — 84443 ASSAY THYROID STIM HORMONE: CPT

## 2024-06-26 PROCEDURE — 93306 TTE W/DOPPLER COMPLETE: CPT

## 2024-06-26 PROCEDURE — 85025 COMPLETE CBC W/AUTO DIFF WBC: CPT

## 2024-06-26 PROCEDURE — 83735 ASSAY OF MAGNESIUM: CPT

## 2024-06-26 PROCEDURE — 86900 BLOOD TYPING SEROLOGIC ABO: CPT

## 2024-06-26 PROCEDURE — 93970 EXTREMITY STUDY: CPT

## 2024-06-26 PROCEDURE — 81001 URINALYSIS AUTO W/SCOPE: CPT

## 2024-06-26 PROCEDURE — 86850 RBC ANTIBODY SCREEN: CPT

## 2024-06-26 PROCEDURE — 87086 URINE CULTURE/COLONY COUNT: CPT

## 2024-06-26 PROCEDURE — 80053 COMPREHEN METABOLIC PANEL: CPT

## 2024-06-26 PROCEDURE — 73552 X-RAY EXAM OF FEMUR 2/>: CPT

## 2024-06-26 PROCEDURE — 85027 COMPLETE CBC AUTOMATED: CPT

## 2024-06-26 PROCEDURE — 72125 CT NECK SPINE W/O DYE: CPT | Mod: MC

## 2024-06-26 PROCEDURE — 83880 ASSAY OF NATRIURETIC PEPTIDE: CPT

## 2024-06-26 PROCEDURE — 85610 PROTHROMBIN TIME: CPT

## 2024-06-26 PROCEDURE — 70450 CT HEAD/BRAIN W/O DYE: CPT | Mod: MC

## 2024-06-26 PROCEDURE — 74176 CT ABD & PELVIS W/O CONTRAST: CPT | Mod: MC

## 2024-06-26 PROCEDURE — 99285 EMERGENCY DEPT VISIT HI MDM: CPT | Mod: 25

## 2024-06-26 PROCEDURE — 85379 FIBRIN DEGRADATION QUANT: CPT

## 2024-06-26 PROCEDURE — 73562 X-RAY EXAM OF KNEE 3: CPT

## 2024-06-26 RX ORDER — CARVEDILOL PHOSPHATE 80 MG/1
1 CAPSULE, EXTENDED RELEASE ORAL
Qty: 0 | Refills: 0 | DISCHARGE
Start: 2024-06-26

## 2024-06-26 RX ORDER — POLYETHYLENE GLYCOL 3350 1 G/G
17 POWDER ORAL
Qty: 0 | Refills: 0 | DISCHARGE
Start: 2024-06-26

## 2024-06-26 RX ORDER — POTASSIUM CHLORIDE 20 MEQ
2 PACKET (EA) ORAL
Qty: 0 | Refills: 0 | DISCHARGE

## 2024-06-26 RX ORDER — METHOCARBAMOL 500 MG
1 TABLET ORAL
Qty: 0 | Refills: 0 | DISCHARGE
Start: 2024-06-26

## 2024-06-26 RX ORDER — LIDOCAINE HCL 28 MG/G
1 GEL TOPICAL
Qty: 0 | Refills: 0 | DISCHARGE
Start: 2024-06-26

## 2024-06-26 RX ORDER — SENNOSIDES 8.6 MG
2 TABLET ORAL
Qty: 0 | Refills: 0 | DISCHARGE
Start: 2024-06-26

## 2024-06-26 RX ADMIN — Medication 650 MILLIGRAM(S): at 06:00

## 2024-06-26 RX ADMIN — CARVEDILOL PHOSPHATE 3.12 MILLIGRAM(S): 80 CAPSULE, EXTENDED RELEASE ORAL at 05:04

## 2024-06-26 RX ADMIN — Medication 650 MILLIGRAM(S): at 05:04

## 2024-11-03 NOTE — ED ADULT NURSE REASSESSMENT NOTE - BEFAST ARM NUMBNESS
Problem: At Risk for Falls  Goal: Patient does not fall  Outcome: Monitoring/Evaluating progress  Goal: Patient takes action to control fall-related risks  Outcome: Monitoring/Evaluating progress      No

## 2025-01-03 ENCOUNTER — EMERGENCY (EMERGENCY)
Facility: HOSPITAL | Age: 75
LOS: 1 days | Discharge: HOME | End: 2025-01-03
Attending: EMERGENCY MEDICINE
Payer: OTHER GOVERNMENT

## 2025-01-03 VITALS
SYSTOLIC BLOOD PRESSURE: 169 MMHG | RESPIRATION RATE: 17 BRPM | TEMPERATURE: 97 F | HEART RATE: 73 BPM | DIASTOLIC BLOOD PRESSURE: 82 MMHG | OXYGEN SATURATION: 96 %

## 2025-01-03 VITALS
WEIGHT: 199.96 LBS | HEART RATE: 84 BPM | OXYGEN SATURATION: 96 % | SYSTOLIC BLOOD PRESSURE: 158 MMHG | RESPIRATION RATE: 16 BRPM | DIASTOLIC BLOOD PRESSURE: 85 MMHG | HEIGHT: 68 IN | TEMPERATURE: 98 F

## 2025-01-03 DIAGNOSIS — Z90.49 ACQUIRED ABSENCE OF OTHER SPECIFIED PARTS OF DIGESTIVE TRACT: Chronic | ICD-10-CM

## 2025-01-03 LAB
ALBUMIN SERPL ELPH-MCNC: 2.6 G/DL — LOW (ref 3.3–5.2)
ALP SERPL-CCNC: 139 U/L — HIGH (ref 40–120)
ALT FLD-CCNC: 25 U/L — SIGNIFICANT CHANGE UP
AMMONIA BLD-MCNC: 45 UMOL/L — SIGNIFICANT CHANGE UP (ref 11–55)
ANION GAP SERPL CALC-SCNC: 11 MMOL/L — SIGNIFICANT CHANGE UP (ref 5–17)
APPEARANCE UR: CLEAR — SIGNIFICANT CHANGE UP
APTT BLD: 43 SEC — HIGH (ref 24.5–35.6)
AST SERPL-CCNC: 80 U/L — HIGH
BACTERIA # UR AUTO: NEGATIVE /HPF — SIGNIFICANT CHANGE UP
BASOPHILS # BLD AUTO: 0.04 K/UL — SIGNIFICANT CHANGE UP (ref 0–0.2)
BASOPHILS NFR BLD AUTO: 0.7 % — SIGNIFICANT CHANGE UP (ref 0–2)
BILIRUB SERPL-MCNC: 4.7 MG/DL — HIGH (ref 0.4–2)
BILIRUB UR-MCNC: ABNORMAL
BUN SERPL-MCNC: 14.1 MG/DL — SIGNIFICANT CHANGE UP (ref 8–20)
CALCIUM SERPL-MCNC: 8.5 MG/DL — SIGNIFICANT CHANGE UP (ref 8.4–10.5)
CAST: 1 /LPF — SIGNIFICANT CHANGE UP (ref 0–4)
CHLORIDE SERPL-SCNC: 105 MMOL/L — SIGNIFICANT CHANGE UP (ref 96–108)
CO2 SERPL-SCNC: 18 MMOL/L — LOW (ref 22–29)
COLOR SPEC: ABNORMAL
CREAT SERPL-MCNC: 0.83 MG/DL — SIGNIFICANT CHANGE UP (ref 0.5–1.3)
DIFF PNL FLD: NEGATIVE — SIGNIFICANT CHANGE UP
EGFR: 92 ML/MIN/1.73M2 — SIGNIFICANT CHANGE UP
EOSINOPHIL # BLD AUTO: 0.27 K/UL — SIGNIFICANT CHANGE UP (ref 0–0.5)
EOSINOPHIL NFR BLD AUTO: 5.1 % — SIGNIFICANT CHANGE UP (ref 0–6)
GLUCOSE SERPL-MCNC: 94 MG/DL — SIGNIFICANT CHANGE UP (ref 70–99)
GLUCOSE UR QL: NEGATIVE MG/DL — SIGNIFICANT CHANGE UP
HCT VFR BLD CALC: 36.5 % — LOW (ref 39–50)
HGB BLD-MCNC: 12.6 G/DL — LOW (ref 13–17)
IMM GRANULOCYTES NFR BLD AUTO: 0.6 % — SIGNIFICANT CHANGE UP (ref 0–0.9)
INR BLD: 1.91 RATIO — HIGH (ref 0.85–1.16)
KETONES UR-MCNC: NEGATIVE MG/DL — SIGNIFICANT CHANGE UP
LEUKOCYTE ESTERASE UR-ACNC: ABNORMAL
LIDOCAIN IGE QN: 66 U/L — HIGH (ref 22–51)
LYMPHOCYTES # BLD AUTO: 1 K/UL — SIGNIFICANT CHANGE UP (ref 1–3.3)
LYMPHOCYTES # BLD AUTO: 18.7 % — SIGNIFICANT CHANGE UP (ref 13–44)
MCHC RBC-ENTMCNC: 34.2 PG — HIGH (ref 27–34)
MCHC RBC-ENTMCNC: 34.5 G/DL — SIGNIFICANT CHANGE UP (ref 32–36)
MCV RBC AUTO: 99.2 FL — SIGNIFICANT CHANGE UP (ref 80–100)
MONOCYTES # BLD AUTO: 0.57 K/UL — SIGNIFICANT CHANGE UP (ref 0–0.9)
MONOCYTES NFR BLD AUTO: 10.7 % — SIGNIFICANT CHANGE UP (ref 2–14)
NEUTROPHILS # BLD AUTO: 3.43 K/UL — SIGNIFICANT CHANGE UP (ref 1.8–7.4)
NEUTROPHILS NFR BLD AUTO: 64.2 % — SIGNIFICANT CHANGE UP (ref 43–77)
NITRITE UR-MCNC: NEGATIVE — SIGNIFICANT CHANGE UP
PH UR: 5.5 — SIGNIFICANT CHANGE UP (ref 5–8)
PLATELET # BLD AUTO: 63 K/UL — LOW (ref 150–400)
POTASSIUM SERPL-MCNC: 4.8 MMOL/L — SIGNIFICANT CHANGE UP (ref 3.5–5.3)
POTASSIUM SERPL-SCNC: 4.8 MMOL/L — SIGNIFICANT CHANGE UP (ref 3.5–5.3)
PROT SERPL-MCNC: 5.6 G/DL — LOW (ref 6.6–8.7)
PROT UR-MCNC: NEGATIVE MG/DL — SIGNIFICANT CHANGE UP
PROTHROM AB SERPL-ACNC: 22 SEC — HIGH (ref 9.9–13.4)
RBC # BLD: 3.68 M/UL — LOW (ref 4.2–5.8)
RBC # FLD: 15.2 % — HIGH (ref 10.3–14.5)
RBC CASTS # UR COMP ASSIST: 5 /HPF — HIGH (ref 0–4)
SODIUM SERPL-SCNC: 134 MMOL/L — LOW (ref 135–145)
SP GR SPEC: 1.02 — SIGNIFICANT CHANGE UP (ref 1–1.03)
SQUAMOUS # UR AUTO: 1 /HPF — SIGNIFICANT CHANGE UP (ref 0–5)
TROPONIN T, HIGH SENSITIVITY RESULT: 14 NG/L — SIGNIFICANT CHANGE UP (ref 0–51)
TROPONIN T, HIGH SENSITIVITY RESULT: 15 NG/L — SIGNIFICANT CHANGE UP (ref 0–51)
UROBILINOGEN FLD QL: 1 MG/DL — SIGNIFICANT CHANGE UP (ref 0.2–1)
WBC # BLD: 5.34 K/UL — SIGNIFICANT CHANGE UP (ref 3.8–10.5)
WBC # FLD AUTO: 5.34 K/UL — SIGNIFICANT CHANGE UP (ref 3.8–10.5)
WBC UR QL: 2 /HPF — SIGNIFICANT CHANGE UP (ref 0–5)

## 2025-01-03 PROCEDURE — 83690 ASSAY OF LIPASE: CPT

## 2025-01-03 PROCEDURE — 99285 EMERGENCY DEPT VISIT HI MDM: CPT | Mod: 25

## 2025-01-03 PROCEDURE — 71045 X-RAY EXAM CHEST 1 VIEW: CPT | Mod: 26

## 2025-01-03 PROCEDURE — 85610 PROTHROMBIN TIME: CPT

## 2025-01-03 PROCEDURE — 84484 ASSAY OF TROPONIN QUANT: CPT

## 2025-01-03 PROCEDURE — 93005 ELECTROCARDIOGRAM TRACING: CPT

## 2025-01-03 PROCEDURE — 99284 EMERGENCY DEPT VISIT MOD MDM: CPT

## 2025-01-03 PROCEDURE — 85025 COMPLETE CBC W/AUTO DIFF WBC: CPT

## 2025-01-03 PROCEDURE — 80053 COMPREHEN METABOLIC PANEL: CPT

## 2025-01-03 PROCEDURE — 85730 THROMBOPLASTIN TIME PARTIAL: CPT

## 2025-01-03 PROCEDURE — 81001 URINALYSIS AUTO W/SCOPE: CPT

## 2025-01-03 PROCEDURE — 71045 X-RAY EXAM CHEST 1 VIEW: CPT

## 2025-01-03 PROCEDURE — 82140 ASSAY OF AMMONIA: CPT

## 2025-01-03 PROCEDURE — 36415 COLL VENOUS BLD VENIPUNCTURE: CPT

## 2025-01-03 PROCEDURE — 93010 ELECTROCARDIOGRAM REPORT: CPT

## 2025-01-03 PROCEDURE — 82962 GLUCOSE BLOOD TEST: CPT

## 2025-01-03 NOTE — ED ADULT NURSE NOTE - CHIEF COMPLAINT QUOTE
BIBA from home c/o dizziness, nausea, headache. As per EMS pt has an aide who last saw patient on 1/1, and when she saw him today he was feeling dizzy and lethargic. Pt states the dizziness worsens with change in position. Ambulatory with walker with EMS

## 2025-01-03 NOTE — ED PROVIDER NOTE - ATTENDING CONTRIBUTION TO CARE
seen with resident, Physical exam documented by me above; symptoms resolved; labs and imagng without acute actionable findings; d/w options for continued monitoring and imaging due to potential risk factors, but patient declines, maintains clear and full capacity; ok for d/c with precautions    I personally saw the patient with the resident, and completed the key components of the history and physical exam. I then discussed the management plan with the resident.

## 2025-01-03 NOTE — ED PROVIDER NOTE - CLINICAL SUMMARY MEDICAL DECISION MAKING FREE TEXT BOX
75 yo M bibems after episode of dizziness. will obtain basic labs 73 yo M bibems after episode of dizziness. labs were obtained which showed elevated liver enzymes correlating with perviously diagnosed cirrhosis. troponin was elevated at 14. A cxr was obtained which showed gaseous abdominal distention. A CT abdomen and pelvis was recommended however patient refused. A repeat troponin ****** 73 yo M bibems after episode of dizziness. labs were obtained which showed elevated liver enzymes correlating with perviously diagnosed cirrhosis. troponin was elevated at 14. A cxr was obtained which showed gaseous abdominal distention. A CT abdomen and pelvis was recommended however patient refused. A repeat troponin was found to be 15. patient was hemodynamically stable for discharge.

## 2025-01-03 NOTE — ED ADULT TRIAGE NOTE - CHIEF COMPLAINT QUOTE
BIBA from home c/o dizziness, nausea, headache. As per EMS pt has an aide who last saw patient on 1/1, and when she saw him today he was feeling dizzy and lethargic. Pt states the dizziness worsens with change in position. BIBA from home c/o dizziness, nausea, headache. As per EMS pt has an aide who last saw patient on 1/1, and when she saw him today he was feeling dizzy and lethargic. Pt states the dizziness worsens with change in position. Ambulatory with walker with EMS

## 2025-01-03 NOTE — ED PROVIDER NOTE - PATIENT PORTAL LINK FT
You can access the FollowMyHealth Patient Portal offered by [facility] by registering at the following website: [email or web address]. By joining [employer]’s FollowMyHealth portal, you will also be able to view your health information using other applications (apps) compatible with our system.

## 2025-01-03 NOTE — ED ADULT NURSE NOTE - NSFALLUNIVINTERV_ED_ALL_ED
Bed/Stretcher in lowest position, wheels locked, appropriate side rails in place/Call bell, personal items and telephone in reach/Instruct patient to call for assistance before getting out of bed/chair/stretcher/Non-slip footwear applied when patient is off stretcher/[place] to call system/Physically safe environment - no spills, clutter or unnecessary equipment/Purposeful proactive rounding/Room/bathroom lighting operational, light cord in reach

## 2025-01-03 NOTE — ED ADULT NURSE NOTE - OBJECTIVE STATEMENT
Patient is A&O X4 unlabored and even breathing Patient is complaining of feeling dizzy and lightheadedness at home when he got up from the couch. Patient denies headache at this time and blurry vision. Patient denies any pain.

## 2025-01-03 NOTE — ED PROVIDER NOTE - OBJECTIVE STATEMENT
pt is a 75 yo M with pmh of cirrhosis presenting to ED with cc of dizziness. patient said he was sleeping in chair when his home health aid woke home up, after waking he felt episode  of severe dizziness and weakness which resolved. when seen patient denies any acute complaints at this time. says dizziness has resolved. denies cp/sob n/v at this time

## 2025-01-08 NOTE — H&P ADULT - NSHPPOAPULMEMBOLUS_GEN_A_CORE
Physical Therapy    Physical Therapy Treatment    Patient Name: Marlo Carl  MRN: [id number]  Today's Date: 1/8/2025  Time Calculation  Start Time: 1732  Stop Time: 1813  Time Calculation (min): 41 min     PT Therapeutic Procedures Time Entry  Neuromuscular Re-Education Time Entry: 12  Therapeutic Exercise Time Entry: 27       Visit number: 3  Insurance: Payor: [employer] / Plan: [employer] / Product Type: *No Product type* /   Plan of Care: 12/4/24 - 3/4/25  Authorization: [employer] APPROVED   Primary Diagnosis: Decreased strength, endurance, and mobility         Assessment:  Patient tolerated all therapeutic activities well with moderate fatigue due to deconditioning requiring a couple prolonged seated rest breaks between 2-3 activities. He continues to demonstrate apathy and poor engagement throughout session. He continues to require max demonstration and verbal cues to perform all exercises with good form. He demonstrated poor carryover of increased step length walking out of clinic with RW post intervention with focus to increase step height and length. Patient would continue to benefit from skilled PT to continue to improve strength, balance, gait, and overall functional mobility.    Plan: Continue per plan of care to improve functional strength and balance. Functional strength in standing       Current Problem  Problem List Items Addressed This Visit             ICD-10-CM    Abnormality of gait and mobility R26.9     Other Visit Diagnoses         Codes    Decreased strength, endurance, and mobility    -  Primary R53.1, Z74.09, R68.89    Imbalance     R26.89    CVA (cerebrovascular accident) (Multi)     I63.9            Subjective    Patient states that he has been doing the home exercises with the help of his daughters.     Precautions:    Precautions  STEADI Fall Risk Score (The score of 4 or more indicates an increased risk of falling): 11  Precautions Comment: High Fall  Risk    Patient with increased fall risk, therefore, balance activities performed with gait belt donned at [facility] for safety.      Pain    0/10 - no pain    Objective     Treatments:  THERAPEUTIC EXERCISE x 27 minutes  - Seated stepper lvl 2.5 x 6 min for strengthening and aerobic conditioning   In // bars with BL UE support  - mini squat 2 x 10  - standing marches x 10  - lateral stepping x 1 pass d/b  - heel-toe raises x 12     NEUROMUSCULAR REEDUCATION x 12 minutes  In // bars with BL UE support:  - walking length of // bars in 10 steps x 2 passes  - walking length of // bars in 8 steps x 2 passes  - walking length of // bars in 6-5 steps x 2 passes  - Seated PWR!Up 2 x 6   - Seated PWR!Step legs only x 10 R/L  - In // bars with BL UE support, A/P weight shift with step over SPC x 10 R/L      Not Today:  - Seated Hamstring Stretch with Chair  30-60 sec hold  - Sit to Stand  with hands on knees x 10  - Seated Trunk Rotation x 8 R/L  - Seated Reaching Across Body x 10 R/L  - Seated marches x 10  - Seated LAQ x 10  - Seated heel toe raises x 10  - Seated Hip adduction with ball x 10  - Seated hip abduction with ball x 10      OP EDUCATION: Educated patient on importance of home program compliance and issued printed home program with exercise tracker for motivation. Patient daughter educated on exercises and encouraged her to assist with patient motivation and to perform exercises with patient to allow for carry-over     HOME EXERCISE PROGRAM:  Access Code: [email or web address]  URL: [email or web address]  Date: 01/02/2025  Prepared by: Deonte Shore    Exercises  - Seated Hamstring Stretch with Chair  - 1 x daily - 7 x weekly - 3 sets - 30-60 sec hold  - Sit to Stand  - 1 x daily - 7 x weekly - 3 sets - 10 reps  - Seated Trunk Rotation  - 1 x daily - 7 x weekly - 3 sets - 10 reps  - Seated Reaching Across Body  - 1 x daily - 7 x weekly - 3 sets - 10 reps  - Seated Reach Forward, Up, and To Sides  - 1 x  daily - 7 x weekly - 3 sets - 10 reps  - Seated March  - 1 x daily - 7 x weekly - 3 sets - 10 reps  - Seated Long Arc Quad  - 1 x daily - 7 x weekly - 3 sets - 10 reps  - Seated Heel Toe Raises  - 1 x daily - 7 x weekly - 3 sets - 10 reps  - Seated Hip Adduction Isometrics with Ball  - 1 x daily - 7 x weekly - 3 sets - 10 reps  - Seated Hip Abduction  - 1 x daily - 7 x weekly - 3 sets - 10 reps    Patient Education  - Sitting Down & Standing Up    Access Code: [email or web address]  URL: [email or web address]  Date: 01/08/2025  Prepared by: Deonte Shore    Exercises  - Mini Squat with Counter Support  - 1 x daily - 7 x weekly - 3 sets - 10 reps  - Standing March with Counter Support  - 1 x daily - 7 x weekly - 3 sets - 10 reps  - Heel Raises with Counter Support  - 1 x daily - 7 x weekly - 3 sets - 10 reps  - Side Stepping with Counter Support  - 1 x daily - 7 x weekly - 3 sets - 10 reps  - Staggered Stance Forward Backward Weight Shift with Counter Support  - 1 x daily - 7 x weekly - 3 sets - 10 reps    Goals:  Active       PT Problem       Patient will perform TUG with LRAD or no device in </= 21.9 sec to meet MDC of 4.85 sec for PD population and demonstrate decreased fall risk       Start:  12/04/24    Expected End:  03/04/25       STG         Patient will improve 30 sec STS test by >/= 2 stands with 1 arm rest or none to demonstrate increased functional LE strength/endurance to assist with gait, stair negotiation, and functional transfers       Start:  12/04/24    Expected End:  03/04/25       LTG         Patient will score >/=35/56 on JEONG to meet MDC (5 pts for Chronic Stroke and PD population) and demonstrate improvement in balance and functional mobility       Start:  12/04/24    Expected End:  03/04/25       LTG         Patient will improve ABC Scale by 10% to demonstrate improved balance confidence        Start:  12/04/24    Expected End:  03/04/25       STG         Patient will be at Minimal  "Assist from Daughter with home exercise program to demonstrate compliance and self-management       Start:  12/04/24    Expected End:  03/04/25       LTG         Patient Stated Goal - \"increase mobility and standing/walking endurance\"        Start:  12/04/24    Expected End:  03/04/25       LTG               " no Ambulatory

## 2025-06-23 ENCOUNTER — EMERGENCY (EMERGENCY)
Facility: HOSPITAL | Age: 75
LOS: 1 days | End: 2025-06-23
Attending: EMERGENCY MEDICINE
Payer: OTHER GOVERNMENT

## 2025-06-23 VITALS
SYSTOLIC BLOOD PRESSURE: 158 MMHG | WEIGHT: 199.96 LBS | TEMPERATURE: 98 F | HEIGHT: 68 IN | OXYGEN SATURATION: 94 % | HEART RATE: 87 BPM | DIASTOLIC BLOOD PRESSURE: 90 MMHG | RESPIRATION RATE: 16 BRPM

## 2025-06-23 DIAGNOSIS — Z90.49 ACQUIRED ABSENCE OF OTHER SPECIFIED PARTS OF DIGESTIVE TRACT: Chronic | ICD-10-CM

## 2025-06-23 PROCEDURE — 71045 X-RAY EXAM CHEST 1 VIEW: CPT

## 2025-06-23 PROCEDURE — 93010 ELECTROCARDIOGRAM REPORT: CPT

## 2025-06-23 PROCEDURE — 99283 EMERGENCY DEPT VISIT LOW MDM: CPT | Mod: 25

## 2025-06-23 PROCEDURE — 99285 EMERGENCY DEPT VISIT HI MDM: CPT

## 2025-06-23 PROCEDURE — 93005 ELECTROCARDIOGRAM TRACING: CPT

## 2025-06-23 PROCEDURE — 71045 X-RAY EXAM CHEST 1 VIEW: CPT | Mod: 26

## 2025-06-23 RX ORDER — AMOXICILLIN AND CLAVULANATE POTASSIUM 500; 125 MG/1; MG/1
1 TABLET, FILM COATED ORAL ONCE
Refills: 0 | Status: COMPLETED | OUTPATIENT
Start: 2025-06-23 | End: 2025-06-23

## 2025-06-23 RX ORDER — AMOXICILLIN AND CLAVULANATE POTASSIUM 500; 125 MG/1; MG/1
1 TABLET, FILM COATED ORAL
Qty: 14 | Refills: 0
Start: 2025-06-23 | End: 2025-06-29

## 2025-06-23 RX ADMIN — AMOXICILLIN AND CLAVULANATE POTASSIUM 1 TABLET(S): 500; 125 TABLET, FILM COATED ORAL at 23:01

## 2025-06-23 NOTE — ED ADULT TRIAGE NOTE - CHIEF COMPLAINT QUOTE
pt BIBA for feeling woozy when ambulating to br. Pt stated he left the AC off all day and felt dizzy when he finally stood up, pt states "I found myself in my recliner again" denies fall/LOC/HS. Also c/o chronic low back pain

## 2025-06-23 NOTE — ED PROVIDER NOTE - NSFOLLOWUPINSTRUCTIONS_ED_ALL_ED_FT
Take antibiotics as prescribed  Take Motrin Tylenol as needed  Follow-up with your PCP for repeat chest x-ray in 1 week  Return probably in case of worsening symptoms

## 2025-06-23 NOTE — ED PROVIDER NOTE - PROGRESS NOTE DETAILS
Patient felt better after being in the ED and refused blood work, chest x-ray showed suspected left lower lobe opacity will give him Augmentin course and discharged home

## 2025-06-23 NOTE — ED PROVIDER NOTE - PATIENT PORTAL LINK FT
You can access the FollowMyHealth Patient Portal offered by [facility] by registering at the following website: [email or web address] By joining [facility] FollowMyHealth portal, you will also be able to view your health information using other applications (apps) compatible with our system.

## 2025-06-23 NOTE — ED PROVIDER NOTE - CLINICAL SUMMARY MEDICAL DECISION MAKING FREE TEXT BOX
Chronic back pain and had a near syncope episode when he tried to get up in a heated apartment on the day of heat wave.  Initial EKG was normal plan to rule out ACS we will check electrolytes if all the workup is negative we will consider walking him with a walker versus a PT eval

## 2025-06-23 NOTE — ED PROVIDER NOTE - OBJECTIVE STATEMENT
75 -year-old male with history of heart murmur obesity chronic back pain and was recently in rehab uses walker at baseline brought in from home by ambulance as he had a near syncope episode when he got up from the recliner and tried to walk.  Patient had no EC hold day and was in the heat.  Patient feels better now patient says that he did not feel like that his apartment was swollen warm.  Patient follows [facility] with [facility].  Patient has no known CAD or stents.  Patient denies alcohol use or smoking.

## 2025-09-18 ENCOUNTER — NON-APPOINTMENT (OUTPATIENT)
Age: 75
End: 2025-09-18